# Patient Record
Sex: MALE | Race: WHITE | NOT HISPANIC OR LATINO | Employment: FULL TIME | URBAN - METROPOLITAN AREA
[De-identification: names, ages, dates, MRNs, and addresses within clinical notes are randomized per-mention and may not be internally consistent; named-entity substitution may affect disease eponyms.]

---

## 2019-08-26 ENCOUNTER — OFFICE VISIT (OUTPATIENT)
Dept: FAMILY MEDICINE CLINIC | Facility: CLINIC | Age: 59
End: 2019-08-26

## 2019-08-26 VITALS
OXYGEN SATURATION: 96 % | WEIGHT: 315 LBS | HEART RATE: 65 BPM | DIASTOLIC BLOOD PRESSURE: 78 MMHG | HEIGHT: 72 IN | BODY MASS INDEX: 42.66 KG/M2 | TEMPERATURE: 97.9 F | SYSTOLIC BLOOD PRESSURE: 122 MMHG

## 2019-08-26 DIAGNOSIS — M19.90 ARTHRITIS: ICD-10-CM

## 2019-08-26 DIAGNOSIS — E11.42 DIABETIC POLYNEUROPATHY ASSOCIATED WITH TYPE 2 DIABETES MELLITUS (HCC): ICD-10-CM

## 2019-08-26 DIAGNOSIS — E11.40 TYPE 2 DIABETES MELLITUS WITH DIABETIC NEUROPATHY, WITHOUT LONG-TERM CURRENT USE OF INSULIN (HCC): ICD-10-CM

## 2019-08-26 DIAGNOSIS — I10 ESSENTIAL HYPERTENSION: Primary | ICD-10-CM

## 2019-08-26 PROBLEM — M54.6 THORACIC BACK PAIN: Status: ACTIVE | Noted: 2019-08-26

## 2019-08-26 PROBLEM — E29.1 HYPOGONADISM MALE: Status: ACTIVE | Noted: 2019-08-26

## 2019-08-26 PROBLEM — E11.9 TYPE 2 DIABETES MELLITUS (HCC): Status: ACTIVE | Noted: 2019-08-26

## 2019-08-26 PROBLEM — M25.561 RIGHT KNEE PAIN: Status: ACTIVE | Noted: 2019-08-26

## 2019-08-26 PROBLEM — N52.9 ERECTILE DYSFUNCTION: Status: ACTIVE | Noted: 2019-08-26

## 2019-08-26 PROBLEM — G62.9 PERIPHERAL NEUROPATHY: Status: ACTIVE | Noted: 2019-08-26

## 2019-08-26 PROBLEM — J30.9 ALLERGIC RHINITIS: Status: ACTIVE | Noted: 2019-08-26

## 2019-08-26 PROBLEM — K60.3 ANAL FISTULA: Status: ACTIVE | Noted: 2019-08-26

## 2019-08-26 PROBLEM — M51.34 DEGENERATION, INTERVERTEBRAL DISC, THORACIC: Status: ACTIVE | Noted: 2019-08-26

## 2019-08-26 PROBLEM — N40.0 BPH (BENIGN PROSTATIC HYPERPLASIA): Status: ACTIVE | Noted: 2019-08-26

## 2019-08-26 PROCEDURE — 99213 OFFICE O/P EST LOW 20 MIN: CPT | Performed by: INTERNAL MEDICINE

## 2019-08-26 RX ORDER — PREGABALIN 100 MG/1
CAPSULE ORAL
Refills: 2 | COMMUNITY
Start: 2019-08-12 | End: 2019-10-14 | Stop reason: SDUPTHER

## 2019-08-26 RX ORDER — METOPROLOL SUCCINATE 100 MG/1
100 TABLET, EXTENDED RELEASE ORAL DAILY
Refills: 1 | COMMUNITY
Start: 2019-08-22 | End: 2020-02-20 | Stop reason: SDUPTHER

## 2019-08-26 RX ORDER — DICLOFENAC SODIUM 75 MG/1
TABLET, DELAYED RELEASE ORAL
Refills: 0 | COMMUNITY
Start: 2019-08-22 | End: 2019-08-26 | Stop reason: SDUPTHER

## 2019-08-26 RX ORDER — DICLOFENAC SODIUM 75 MG/1
75 TABLET, DELAYED RELEASE ORAL 2 TIMES DAILY
Qty: 180 TABLET | Refills: 1 | Status: SHIPPED | OUTPATIENT
Start: 2019-08-26 | End: 2019-12-05 | Stop reason: SDUPTHER

## 2019-08-26 RX ORDER — DULOXETIN HYDROCHLORIDE 60 MG/1
CAPSULE, DELAYED RELEASE ORAL
Refills: 5 | COMMUNITY
Start: 2019-08-05

## 2019-08-26 RX ORDER — LISINOPRIL AND HYDROCHLOROTHIAZIDE 12.5; 1 MG/1; MG/1
TABLET ORAL
COMMUNITY
Start: 2018-06-26 | End: 2019-08-26 | Stop reason: SDUPTHER

## 2019-08-26 RX ORDER — LISINOPRIL AND HYDROCHLOROTHIAZIDE 12.5; 1 MG/1; MG/1
1 TABLET ORAL DAILY
Qty: 90 TABLET | Refills: 1 | Status: SHIPPED | OUTPATIENT
Start: 2019-08-26 | End: 2020-06-15 | Stop reason: SDUPTHER

## 2019-08-26 NOTE — PROGRESS NOTES
Subjective   Eduardo Ramires is a 59 y.o. male.     Chief Complaint   Patient presents with   • Hypertension   • Arthritis   • Med Refill       History of Present Illness   Follow-up for hypertension.  Currently has been feeling well and asymptomatic without any headaches,vision changes, cough, chest pain, shortness of breath, swelling, focal neurologic deficit, memory loss or syncope.  Has been taking the medications regularly and adherent with the regimen, Denies medication side effects and no significant interval events.   Recently was seen by hem/onc for possible Multiple Myeloma and was negative with normal labs.    The following portions of the patient's history were reviewed and updated as appropriate: allergies, current medications, past family history, past medical history, past social history, past surgical history and problem list.    Past Medical History:   Diagnosis Date   • Allergic rhinitis 8/26/2019   • Anal fistula 8/26/2019   • Arthritis 8/26/2019   • BPH (benign prostatic hyperplasia) 8/26/2019   • Degeneration, intervertebral disc, thoracic 8/26/2019   • Degenerative joint disease 8/26/2019   • Diabetic polyneuropathy (CMS/HCC) 8/26/2019   • Erectile dysfunction 8/26/2019   • Hypertension 8/26/2019   • Hypogonadism male 8/26/2019   • Nephrolithiasis    • Peripheral neuropathy 8/26/2019   • Right knee pain 8/26/2019   • Thoracic back pain 8/26/2019   • Type 2 diabetes mellitus (CMS/HCC) 8/26/2019       Past Surgical History:   Procedure Laterality Date   • COLONOSCOPY  2010    normal   • KIDNEY STONE SURGERY  2016    laser stone surgery   • LUMBAR SPINE SURGERY  1989   • SHOULDER ROTATOR CUFF REPAIR Left 2012   • TOTAL HIP ARTHROPLASTY Left 12/10/2018    Dr Barraza       Family History   Problem Relation Age of Onset   • Valvular heart disease Mother    • Arthritis Father    • Hypertension Father    • Diabetes type II Father        Social History     Socioeconomic History   • Marital status:       Spouse name: Not on file   • Number of children: Not on file   • Years of education: Not on file   • Highest education level: Not on file   Tobacco Use   • Smoking status: Former Smoker   • Smokeless tobacco: Never Used   Substance and Sexual Activity   • Alcohol use: Yes     Comment: occasionally   • Drug use: Yes     Types: Marijuana       Review of Systems    Objective   Vitals:    08/26/19 0959   BP: 122/78   Pulse: 65   Temp: 97.9 °F (36.6 °C)   SpO2: 96%     Body mass index is 44.86 kg/m².  Physical Exam    Recent Results (from the past 2016 hour(s))   VITAMIN B12    Collection Time: 06/25/19  9:17 AM   Result Value Ref Range    Vitamin B-12 340 213 - 816 pg/mL   IMMUNOFIXATION SERUM    Collection Time: 06/25/19  9:17 AM   Result Value Ref Range    Interpretation       See Results and Interpretation under Pathology Tab.    IgG 1,206 694 - 1,618 mg/dL    IgA 318 68 - 378 mg/dL    IgM 58 (L) 60 - 263 mg/dL   PROTEIN ELECTROPHORESIS, TOTAL    Collection Time: 06/25/19  9:17 AM   Result Value Ref Range    Comment       See Results and Interpretation under Pathology Tab.   COMPREHENSIVE METABOLIC PANEL    Collection Time: 07/10/19 11:52 AM   Result Value Ref Range    Sodium 139 137 - 145 mmol/L    Potassium 4.5 3.5 - 5.1 mmol/L    Chloride 98 98 - 107 mmol/L    Total CO2 30 22 - 30 mmol/L    Glucose 148 mg/dL    BUN 18 7 - 20 mg/dL    Creatinine 0.9 0.7 - 1.5 mg/dL    BUN/Creatinine Ratio 20.0 RATIO    Total Protein 8.3 (H) 6.3 - 8.2 g/dL    Albumin 4.3 3.5 - 5.0 g/dL    Globulin 4.0 1.5 - 4.5 g/dL    A/G Ratio 1.1 1.1 - 2.5 RATIO    Calcium 9.2 8.4 - 10.2 mg/dL    Total Bilirubin 0.6 0.2 - 1.3 mg/dL    AST (SGOT) 24 15 - 46 U/L    ALT (SGPT) 30 13 - 69 U/L    Alkaline Phosphatase 81 38 - 126 U/L    Est GFR by Clearance >60 >60 /1.73 m2   HEMOGLOBIN A1C    Collection Time: 07/10/19 11:52 AM   Result Value Ref Range    Hemoglobin A1C 6.7 (H) 4.3 - 5.6 %    Mean Bld Glu Estim. 146 mg/dL   PROTEIN  ELECTROPHORESIS, TOTAL    Collection Time: 07/10/19 11:52 AM   Result Value Ref Range    Comment       See Results and Interpretation under Pathology Tab.   IMMUNOFIXATION SERUM    Collection Time: 07/10/19 11:52 AM   Result Value Ref Range    Interpretation       See Results and Interpretation under Pathology Tab.    IgG 1,232 694 - 1,618 mg/dL    IgA 331 68 - 378 mg/dL    IgM 50 (L) 60 - 263 mg/dL   CBC AND DIFFERENTIAL    Collection Time: 07/10/19 11:52 AM   Result Value Ref Range    WBC 11.10 (H) 4.5 - 11.0 10*3/uL    RBC 5.73 4.5 - 5.9 10*6/uL    Hemoglobin 16.5 13.5 - 17.5 g/dL    Hematocrit 48.6 41.0 - 53.0 %    MCV 84.8 80.0 - 100.0 fL    MCH 28.8 26.0 - 34.0 pg    MCHC 34.0 31.0 - 37.0 g/dL    RDW 14.3 12.0 - 16.8 %    Platelets 285 140 - 440 10*3/uL    MPV 10.1 6.7 - 10.8 fL    Neutrophil Rel % 62.9 45 - 80 %    Lymphocyte Rel % 25.6 15 - 50 %    Monocyte Rel % 6.2 0 - 15 %    Eosinophil % 4.9 0 - 7 %    Basophil Rel % 0.4 0 - 2 %    Neutrophils Absolute 6.99 2.0 - 8.8 10*3/uL    Lymphocytes Absolute 2.84 0.7 - 5.5 10*3/uL    Monocytes Absolute 0.69 0.0 - 1.7 10*3/uL    Eosinophils Absolute 0.54 0.0 - 0.8 10*3/uL    Basophils Absolute 0.04 0.0 - 0.2 10*3/uL   IMMUNOGLOBULIN FREE LT CHAINS BLOOD    Collection Time: 07/10/19 11:52 AM   Result Value Ref Range    Kappa FLC 21.24 (H) 3.30 - 19.40 mg/L    Urine Free Lambda Excretion/Day 19.73 5.71 - 26.30 mg/L    Kappa/Lambda FluidC Ratio 1.08 0.26 - 1.65 mg/L     Assessment/Plan   Eduardo was seen today for hypertension, arthritis and med refill.    Diagnoses and all orders for this visit:    Essential hypertension  -     lisinopril-hydrochlorothiazide (PRINZIDE,ZESTORETIC) 10-12.5 MG per tablet; Take 1 tablet by mouth Daily.    Arthritis  -     diclofenac (VOLTAREN) 75 MG EC tablet; Take 1 tablet by mouth 2 (Two) Times a Day.    Type 2 diabetes mellitus with diabetic neuropathy, without long-term current use of insulin (CMS/[facility])    Diabetic polyneuropathy  associated with type 2 diabetes mellitus (CMS/HCC)    BMI 40.0-44.9, adult (CMS/HCC)    Labs reviewed from June 2019.  Continue the current medications and no change.  Encouraged diet and exercise for weight loss and diabetes treatment.

## 2019-08-26 NOTE — PATIENT INSTRUCTIONS
Diabetes Mellitus and Foot Care  Foot care is an important part of your health, especially when you have diabetes. Diabetes may cause you to have problems because of poor blood flow (circulation) to your feet and legs, which can cause your skin to:  · Become thinner and drier.  · Break more easily.  · Heal more slowly.  · Peel and crack.  You may also have nerve damage (neuropathy) in your legs and feet, causing decreased feeling in them. This means that you may not notice minor injuries to your feet that could lead to more serious problems. Noticing and addressing any potential problems early is the best way to prevent future foot problems.  How to care for your feet  Foot hygiene  · Wash your feet daily with warm water and mild soap. Do not use hot water. Then, pat your feet and the areas between your toes until they are completely dry. Do not soak your feet as this can dry your skin.  · Trim your toenails straight across. Do not dig under them or around the cuticle. File the edges of your nails with an emery board or nail file.  · Apply a moisturizing lotion or petroleum jelly to the skin on your feet and to dry, brittle toenails. Use lotion that does not contain alcohol and is unscented. Do not apply lotion between your toes.  Shoes and socks  · Wear clean socks or stockings every day. Make sure they are not too tight. Do not wear knee-high stockings since they may decrease blood flow to your legs.  · Wear shoes that fit properly and have enough cushioning. Always look in your shoes before you put them on to be sure there are no objects inside.  · To break in new shoes, wear them for just a few hours a day. This prevents injuries on your feet.  Wounds, scrapes, corns, and calluses  · Check your feet daily for blisters, cuts, bruises, sores, and redness. If you cannot see the bottom of your feet, use a mirror or ask someone for help.  · Do not cut corns or calluses or try to remove them with medicine.  · If you  find a minor scrape, cut, or break in the skin on your feet, keep it and the skin around it clean and dry. You may clean these areas with mild soap and water. Do not clean the area with peroxide, alcohol, or iodine.  · If you have a wound, scrape, corn, or callus on your foot, look at it several times a day to make sure it is healing and not infected. Check for:  ? Redness, swelling, or pain.  ? Fluid or blood.  ? Warmth.  ? Pus or a bad smell.  General instructions  · Do not cross your legs. This may decrease blood flow to your feet.  · Do not use heating pads or hot water bottles on your feet. They may burn your skin. If you have lost feeling in your feet or legs, you may not know this is happening until it is too late.  · Protect your feet from hot and cold by wearing shoes, such as at the beach or on hot pavement.  · Schedule a complete foot exam at least once a year (annually) or more often if you have foot problems. If you have foot problems, report any cuts, sores, or bruises to your health care provider immediately.  Contact a health care provider if:  · You have a medical condition that increases your risk of infection and you have any cuts, sores, or bruises on your feet.  · You have an injury that is not healing.  · You have redness on your legs or feet.  · You feel burning or tingling in your legs or feet.  · You have pain or cramps in your legs and feet.  · Your legs or feet are numb.  · Your feet always feel cold.  · You have pain around a toenail.  Get help right away if:  · You have a wound, scrape, corn, or callus on your foot and:  ? You have pain, swelling, or redness that gets worse.  ? You have fluid or blood coming from the wound, scrape, corn, or callus.  ? Your wound, scrape, corn, or callus feels warm to the touch.  ? You have pus or a bad smell coming from the wound, scrape, corn, or callus.  ? You have a fever.  ? You have a red line going up your leg.  Summary  · Check your feet every day  for cuts, sores, red spots, swelling, and blisters.  · Moisturize feet and legs daily.  · Wear shoes that fit properly and have enough cushioning.  · If you have foot problems, report any cuts, sores, or bruises to your health care provider immediately.  · Schedule a complete foot exam at least once a year (annually) or more often if you have foot problems.  This information is not intended to replace advice given to you by your health care provider. Make sure you discuss any questions you have with your health care provider.  Document Released: 12/15/2001 Document Revised: 01/30/2019 Document Reviewed: 01/19/2018  [employer] Interactive Patient Education © 2019 [employer]  Diabetes Mellitus and Nutrition, Adult  When you have diabetes (diabetes mellitus), it is very important to have healthy eating habits because your blood sugar (glucose) levels are greatly affected by what you eat and drink. Eating healthy foods in the appropriate amounts, at about the same times every day, can help you:  · Control your blood glucose.  · Lower your risk of heart disease.  · Improve your blood pressure.  · Reach or maintain a healthy weight.  Every person with diabetes is different, and each person has different needs for a meal plan. Your health care provider may recommend that you work with a diet and nutrition specialist (dietitian) to make a meal plan that is best for you. Your meal plan may vary depending on factors such as:  · The calories you need.  · The medicines you take.  · Your weight.  · Your blood glucose, blood pressure, and cholesterol levels.  · Your activity level.  · Other health conditions you have, such as heart or kidney disease.  How do carbohydrates affect me?  Carbohydrates, also called carbs, affect your blood glucose level more than any other type of food. Eating carbs naturally raises the amount of glucose in your blood. Carb counting is a method for keeping track of how many carbs you eat. Counting  "carbs is important to keep your blood glucose at a healthy level, especially if you use insulin or take certain oral diabetes medicines.  It is important to know how many carbs you can safely have in each meal. This is different for every person. Your dietitian can help you calculate how many carbs you should have at each meal and for each snack.  Foods that contain carbs include:  · Bread, cereal, rice, pasta, and crackers.  · Potatoes and corn.  · Peas, beans, and lentils.  · Milk and yogurt.  · Fruit and juice.  · Desserts, such as cakes, cookies, ice cream, and candy.  How does alcohol affect me?  Alcohol can cause a sudden decrease in blood glucose (hypoglycemia), especially if you use insulin or take certain oral diabetes medicines. Hypoglycemia can be a life-threatening condition. Symptoms of hypoglycemia (sleepiness, dizziness, and confusion) are similar to symptoms of having too much alcohol.  If your health care provider says that alcohol is safe for you, follow these guidelines:  · Limit alcohol intake to no more than 1 drink per day for nonpregnant women and 2 drinks per day for men. One drink equals 12 oz of beer, 5 oz of wine, or 1½ oz of hard liquor.  · Do not drink on an empty stomach.  · Keep yourself hydrated with water, diet soda, or unsweetened iced tea.  · Keep in mind that regular soda, juice, and other mixers may contain a lot of sugar and must be counted as carbs.  What are tips for following this plan?    Reading food labels  · Start by checking the serving size on the \"Nutrition Facts\" label of packaged foods and drinks. The amount of calories, carbs, fats, and other nutrients listed on the label is based on one serving of the item. Many items contain more than one serving per package.  · Check the total grams (g) of carbs in one serving. You can calculate the number of servings of carbs in one serving by dividing the total carbs by 15. For example, if a food has 30 g of total carbs, it " "would be equal to 2 servings of carbs.  · Check the number of grams (g) of saturated and trans fats in one serving. Choose foods that have low or no amount of these fats.  · Check the number of milligrams (mg) of salt (sodium) in one serving. Most people should limit total sodium intake to less than 2,300 mg per day.  · Always check the nutrition information of foods labeled as \"low-fat\" or \"nonfat\". These foods may be higher in added sugar or refined carbs and should be avoided.  · Talk to your dietitian to identify your daily goals for nutrients listed on the label.  Shopping  · Avoid buying canned, premade, or processed foods. These foods tend to be high in fat, sodium, and added sugar.  · Shop around the outside edge of the grocery store. This includes fresh fruits and vegetables, bulk grains, fresh meats, and fresh dairy.  Cooking  · Use low-heat cooking methods, such as baking, instead of high-heat cooking methods like deep frying.  · Cook using healthy oils, such as olive, canola, or sunflower oil.  · Avoid cooking with butter, cream, or high-fat meats.  Meal planning  · Eat meals and snacks regularly, preferably at the same times every day. Avoid going long periods of time without eating.  · Eat foods high in fiber, such as fresh fruits, vegetables, beans, and whole grains. Talk to your dietitian about how many servings of carbs you can eat at each meal.  · Eat 4-6 ounces (oz) of lean protein each day, such as lean meat, chicken, fish, eggs, or tofu. One oz of lean protein is equal to:  ? 1 oz of meat, chicken, or fish.  ? 1 egg.  ? ¼ cup of tofu.  · Eat some foods each day that contain healthy fats, such as avocado, nuts, seeds, and fish.  Lifestyle  · Check your blood glucose regularly.  · Exercise regularly as told by your health care provider. This may include:  ? 150 minutes of moderate-intensity or vigorous-intensity exercise each week. This could be brisk walking, biking, or water " aerobics.  ? Stretching and doing strength exercises, such as yoga or weightlifting, at least 2 times a week.  · Take medicines as told by your health care provider.  · Do not use any products that contain nicotine or tobacco, such as cigarettes and e-cigarettes. If you need help quitting, ask your health care provider.  · Work with a counselor or diabetes educator to identify strategies to manage stress and any emotional and social challenges.  Questions to ask a health care provider  · Do I need to meet with a diabetes educator?  · Do I need to meet with a dietitian?  · What number can I call if I have questions?  · When are the best times to check my blood glucose?  Where to find more information:  · American Diabetes Association: diabetes.org  · Academy of Nutrition and Dietetics: www.eatright.org  · National [place] of Diabetes and Digestive and Kidney Diseases (NIH): www.niddk.nih.gov  Summary  · A healthy meal plan will help you control your blood glucose and maintain a healthy lifestyle.  · Working with a diet and nutrition specialist (dietitian) can help you make a meal plan that is best for you.  · Keep in mind that carbohydrates (carbs) and alcohol have immediate effects on your blood glucose levels. It is important to count carbs and to use alcohol carefully.  This information is not intended to replace advice given to you by your health care provider. Make sure you discuss any questions you have with your health care provider.  Document Released: 09/14/2006 Document Revised: 07/18/2018 Document Reviewed: 01/22/2018  [employer] Patient Education © 2019 [employer]

## 2019-10-14 RX ORDER — PREGABALIN 100 MG/1
CAPSULE ORAL
Qty: 90 CAPSULE | Refills: 2 | Status: SHIPPED | OUTPATIENT
Start: 2019-10-14

## 2019-12-01 ENCOUNTER — HOSPITAL ENCOUNTER (EMERGENCY)
Facility: HOSPITAL | Age: 59
Discharge: HOME | End: 2019-12-01
Attending: EMERGENCY MEDICINE | Admitting: EMERGENCY MEDICINE

## 2019-12-01 ENCOUNTER — APPOINTMENT (OUTPATIENT)
Dept: GENERAL RADIOLOGY | Facility: HOSPITAL | Age: 59
End: 2019-12-01

## 2019-12-01 VITALS
HEART RATE: 107 BPM | WEIGHT: 315 LBS | SYSTOLIC BLOOD PRESSURE: 149 MMHG | OXYGEN SATURATION: 96 % | HEIGHT: 71 IN | RESPIRATION RATE: 14 BRPM | DIASTOLIC BLOOD PRESSURE: 90 MMHG | TEMPERATURE: 98.1 F | BODY MASS INDEX: 44.1 KG/M2

## 2019-12-01 DIAGNOSIS — S80.02XA CONTUSION OF LEFT KNEE, INITIAL ENCOUNTER: ICD-10-CM

## 2019-12-01 DIAGNOSIS — M25.511 ACUTE PAIN OF RIGHT SHOULDER: Primary | ICD-10-CM

## 2019-12-01 PROCEDURE — 25010000002 KETOROLAC TROMETHAMINE PER 15 MG: Performed by: EMERGENCY MEDICINE

## 2019-12-01 PROCEDURE — 73030 X-RAY EXAM OF SHOULDER: CPT

## 2019-12-01 PROCEDURE — 96372 THER/PROPH/DIAG INJ SC/IM: CPT

## 2019-12-01 PROCEDURE — 99283 EMERGENCY DEPT VISIT LOW MDM: CPT

## 2019-12-01 RX ORDER — KETOROLAC TROMETHAMINE 30 MG/ML
30 INJECTION, SOLUTION INTRAMUSCULAR; INTRAVENOUS ONCE
Status: COMPLETED | OUTPATIENT
Start: 2019-12-01 | End: 2019-12-01

## 2019-12-01 RX ADMIN — KETOROLAC TROMETHAMINE 30 MG: 30 INJECTION, SOLUTION INTRAMUSCULAR at 17:55

## 2019-12-01 NOTE — ED TRIAGE NOTES
"Pt tripped on sidewalk walking into work. Pt c/o right shoulder and left knee pain. Pt \"face planted in the grass.\" Denies LOC, no blood thinners.  "

## 2019-12-01 NOTE — DISCHARGE INSTRUCTIONS
Warm heating pads, light stretching and range of motion exercises, sling for comfort for the next 1 to 2 days, Tylenol and ibuprofen for pain, follow-up with your primary care provider and orthopedic for recheck if no improvement in 1 week.  Return to the emergency department for worsening symptoms as needed.

## 2019-12-01 NOTE — ED PROVIDER NOTES
EMERGENCY DEPARTMENT ENCOUNTER    Room Number:  07/07  Date of encounter:  12/1/2019  PCP: Stan Calderon MD  Historian: Patient      HPI:  Chief Complaint: Right shoulder pain  A complete HPI/ROS/PMH/PSH/SH/FH are unobtainable due to: None    Context: Eduardo Ramires is a 59 y.o. male who presents to the ED c/o right shoulder pain and left knee pain after tripping and falling earlier this afternoon.  Patient states that he landed on his left knee and then caught himself with his right arm.  Has been ambulatory on the leg states that his knee pain has improved does have a bruise there he states.  Major complaint at this point is right shoulder pain with decreased range of motion due to pain.  Denies head injury or neck pain.  States that he did hit his face on the grass, denies pain or difficulty opening or closing the mouth.  Denies any nosebleed or swelling of the face.  Denies loss of consciousness.      PAST MEDICAL HISTORY  Active Ambulatory Problems     Diagnosis Date Noted   • Allergic rhinitis 08/26/2019   • Anal fistula 08/26/2019   • Arthritis 08/26/2019   • BPH (benign prostatic hyperplasia) 08/26/2019   • Degeneration, intervertebral disc, thoracic 08/26/2019   • Degenerative joint disease 08/26/2019   • Diabetic polyneuropathy (CMS/[facility]) 08/26/2019   • Hypertension 08/26/2019   • Erectile dysfunction 08/26/2019   • Hypogonadism male 08/26/2019   • Type 2 diabetes mellitus (CMS/[facility]) 08/26/2019   • Peripheral neuropathy 08/26/2019   • Right knee pain 08/26/2019   • Thoracic back pain 08/26/2019   • BMI 40.0-44.9, adult (CMS/[facility]) 08/26/2019     Resolved Ambulatory Problems     Diagnosis Date Noted   • No Resolved Ambulatory Problems     Past Medical History:   Diagnosis Date   • Allergic rhinitis 8/26/2019   • Anal fistula 8/26/2019   • Arthritis 8/26/2019   • BPH (benign prostatic hyperplasia) 8/26/2019   • Degeneration, intervertebral disc, thoracic 8/26/2019   • Degenerative joint disease 8/26/2019    • Diabetic polyneuropathy (CMS/[facility]) 8/26/2019   • Erectile dysfunction 8/26/2019   • Hypertension 8/26/2019   • Hypogonadism male 8/26/2019   • Nephrolithiasis    • Peripheral neuropathy 8/26/2019   • Right knee pain 8/26/2019   • Thoracic back pain 8/26/2019   • Type 2 diabetes mellitus (CMS/[facility]) 8/26/2019         PAST SURGICAL HISTORY  Past Surgical History:   Procedure Laterality Date   • COLONOSCOPY  2010    normal   • KIDNEY STONE SURGERY  2016    laser stone surgery   • LUMBAR SPINE SURGERY  1989   • SHOULDER ROTATOR CUFF REPAIR Left 2012   • TOTAL HIP ARTHROPLASTY Left 12/10/2018    Dr Barraza         FAMILY HISTORY  Family History   Problem Relation Age of Onset   • Valvular heart disease Mother    • Arthritis Father    • Hypertension Father    • Diabetes type II Father          SOCIAL HISTORY  Social History     Socioeconomic History   • Marital status:      Spouse name: Not on file   • Number of children: Not on file   • Years of education: Not on file   • Highest education level: Not on file   Tobacco Use   • Smoking status: Former Smoker   • Smokeless tobacco: Never Used   Substance and Sexual Activity   • Alcohol use: Yes     Comment: occasionally   • Drug use: Yes     Types: Marijuana         ALLERGIES  Patient has no known allergies.        REVIEW OF SYSTEMS  Review of Systems     All systems reviewed and negative except for those discussed in HPI.       PHYSICAL EXAM    I have reviewed the triage vital signs and nursing notes.    ED Triage Vitals   Temp Heart Rate Resp BP SpO2   12/01/19 1521 12/01/19 1521 12/01/19 1627 12/01/19 1627 12/01/19 1521   98.1 °F (36.7 °C) 107 14 149/90 96 %      Temp src Heart Rate Source Patient Position BP Location FiO2 (%)   12/01/19 1521 12/01/19 1521 12/01/19 1627 12/01/19 1627 --   Tympanic Monitor Sitting Left arm        Physical Exam  General: Awake, alert, no acute distress  HEENT: EOMI, no facial hematoma, laceration, edema.  No epistaxis.  Normal range  of motion of the jaw with no tenderness to the face.  Neck: Full range of motion, nontender  Pulm: Symmetric chest rise, nonlabored breathing  CV: Regular rate and rhythm  GI: Non-distended  MSK: No deformity, reproducible pain in the right shoulder with movement.  Minimal reducible pain to palpation, no palpable deformity in the clavicle or shoulder joint.  Normal range of motion of the right elbow and wrist without pain.    Skin: Warm, dry  Neuro: Alert and oriented x 3, moving all extremities, strength and sensation is 5 out of 5 distally in the right upper extremity with decreased strength proximally due to pain, no focal deficits  Psych: Calm, cooperative    Vital signs and nursing notes reviewed.           LAB RESULTS  No results found for this or any previous visit (from the past 24 hour(s)).          RADIOLOGY  Xr Shoulder 2+ View Right    Result Date: 12/1/2019  XR SHOULDER 2+ VW RIGHT-  12/01/2019  HISTORY: Fell, right shoulder pain.  There is mild right AC joint arthropathy.  No fractures or dislocations are seen.         I ordered the above noted radiological studies. Reviewed by me.  See dictation for official radiology interpretation.      PROCEDURES    Procedures      MEDICATIONS GIVEN IN ER    Medications   ketorolac (TORADOL) injection 30 mg (30 mg Intramuscular Given 12/1/19 1755)         PROGRESS, DATA ANALYSIS, CONSULTS, AND MEDICAL DECISION MAKING    All labs have been independently reviewed by me.  All radiology studies have been reviewed by me and discussed with radiologist dictating the report.   EKG's independently viewed and interpreted by me.  Discussion below represents my analysis of pertinent findings related to patient's condition, differential diagnosis, treatment plan and final disposition.      ED Course as of Dec 01 1927   Sun Dec 01, 2019   1748 Patient updated on the negative x-ray findings without evidence of fracture or dislocation.  Plan for IM Toradol for pain control prior  to discharge, sling for comfort though I have heavily counseled him on the need for heat and increased range of motion exercises and to limit use of sling.  He states he already has an orthopedic he will follow-up with if needed.  [DC]      ED Course User Index  [DC] Ervin Owusu MD       AS OF 7:27 PM VITALS:    BP - 149/90  HR - 107  TEMP - 98.1 °F (36.7 °C) (Tympanic)  02 SATS - 96%        DIAGNOSIS  Final diagnoses:   Acute pain of right shoulder   Contusion of left knee, initial encounter         DISPOSITION  DISCHARGE    Patient discharged in stable condition.    Reviewed implications of results, diagnosis, meds, responsibility to follow up, warning signs and symptoms of possible worsening, potential complications and reasons to return to ER.    Patient/Family voiced understanding of above instructions.    Discussed plan for discharge, as there is no emergent indication for admission. Patient referred to primary care provider for BP management due to today's BP. Pt/family is agreeable and understands need for follow up and repeat testing.  Pt is aware that discharge does not mean that nothing is wrong but it indicates no emergency is present that requires admission and they must continue care with follow-up as given below or physician of their choice.     FOLLOW-UP  [facility] Emergency Department  [address]  [facility] [id number] [phone]    As needed, If symptoms worsen    Stan Calderon MD  [address]  [address]  [facility] [address]  [phone]    Schedule an appointment as soon as possible for a visit   As needed    Trent Krishnamurthy II, MD  [address]  [address]  [facility] [address]  [phone]    Schedule an appointment as soon as possible for a visit   As needed         Medication List      No changes were made to your prescriptions during this visit.                  Ervin Owusu MD  12/01/19 9585

## 2019-12-05 ENCOUNTER — OFFICE VISIT (OUTPATIENT)
Dept: FAMILY MEDICINE CLINIC | Facility: CLINIC | Age: 59
End: 2019-12-05

## 2019-12-05 VITALS
BODY MASS INDEX: 42.66 KG/M2 | SYSTOLIC BLOOD PRESSURE: 132 MMHG | DIASTOLIC BLOOD PRESSURE: 86 MMHG | OXYGEN SATURATION: 98 % | TEMPERATURE: 97.8 F | HEART RATE: 76 BPM | WEIGHT: 315 LBS | HEIGHT: 72 IN

## 2019-12-05 DIAGNOSIS — Z12.11 COLON CANCER SCREENING: ICD-10-CM

## 2019-12-05 DIAGNOSIS — J30.0 VASOMOTOR RHINITIS: Primary | ICD-10-CM

## 2019-12-05 DIAGNOSIS — Z23 NEED FOR IMMUNIZATION AGAINST INFLUENZA: ICD-10-CM

## 2019-12-05 DIAGNOSIS — E66.01 CLASS 3 SEVERE OBESITY WITHOUT SERIOUS COMORBIDITY WITH BODY MASS INDEX (BMI) OF 45.0 TO 49.9 IN ADULT, UNSPECIFIED OBESITY TYPE (HCC): ICD-10-CM

## 2019-12-05 DIAGNOSIS — M19.90 ARTHRITIS: ICD-10-CM

## 2019-12-05 DIAGNOSIS — H65.02 ACUTE SEROUS OTITIS MEDIA OF LEFT EAR WITHOUT RUPTURE: ICD-10-CM

## 2019-12-05 DIAGNOSIS — E29.1 HYPOGONADISM MALE: ICD-10-CM

## 2019-12-05 PROBLEM — J31.0 RHINITIS: Status: ACTIVE | Noted: 2019-12-05

## 2019-12-05 PROBLEM — H65.00: Status: ACTIVE | Noted: 2019-12-05

## 2019-12-05 PROCEDURE — 90471 IMMUNIZATION ADMIN: CPT | Performed by: INTERNAL MEDICINE

## 2019-12-05 PROCEDURE — 90686 IIV4 VACC NO PRSV 0.5 ML IM: CPT | Performed by: INTERNAL MEDICINE

## 2019-12-05 PROCEDURE — 99214 OFFICE O/P EST MOD 30 MIN: CPT | Performed by: INTERNAL MEDICINE

## 2019-12-05 RX ORDER — DICLOFENAC SODIUM 75 MG/1
75 TABLET, DELAYED RELEASE ORAL 2 TIMES DAILY
Qty: 180 TABLET | Refills: 1 | Status: SHIPPED | OUTPATIENT
Start: 2019-12-05 | End: 2020-06-15 | Stop reason: SDUPTHER

## 2019-12-05 RX ORDER — TESTOSTERONE CYPIONATE 200 MG/ML
200 INJECTION, SOLUTION INTRAMUSCULAR
Qty: 1 ML | Refills: 3 | Status: SHIPPED | OUTPATIENT
Start: 2019-12-05 | End: 2020-02-20 | Stop reason: SDUPTHER

## 2019-12-05 RX ORDER — FLUTICASONE PROPIONATE 50 MCG
1 SPRAY, SUSPENSION (ML) NASAL
COMMUNITY

## 2019-12-05 RX ORDER — IPRATROPIUM BROMIDE 42 UG/1
2 SPRAY, METERED NASAL 4 TIMES DAILY
Qty: 1 EACH | Refills: 12 | Status: SHIPPED | OUTPATIENT
Start: 2019-12-05

## 2019-12-05 NOTE — PROGRESS NOTES
Subjective   Eduardo Ramires is a 59 y.o. male.     Chief Complaint   Patient presents with   • Ear Problem   • Sinus Problem       History of Present Illness   States when bends forward feels like something moving in the left ear.  No pain or drainage.  Has popping in both ears.  Does have some sinus congestion with no headache but clear nasal drainage.  The runny nose is worse when eating.  No fever, chills, nausea or vomiting.  Patient is wanting to start back on the testosterone and has not had the testosterone injection in the last year.  Having decreased libido and ED issues,  Mild fatigue.  Testosterone level in May was 91 on no medications.  The following portions of the patient's history were reviewed and updated as appropriate: allergies, current medications, past family history, past medical history, past social history, past surgical history and problem list.    Past Medical History:   Diagnosis Date   • Allergic rhinitis 8/26/2019   • Anal fistula 8/26/2019   • Arthritis 8/26/2019   • BMI 45.0-49.9, adult (CMS/[facility])    • BPH (benign prostatic hyperplasia) 8/26/2019   • Degeneration, intervertebral disc, thoracic 8/26/2019   • Degenerative joint disease 8/26/2019   • Degenerative joint disease of left hip    • Diabetic polyneuropathy (CMS/[facility]) 8/26/2019   • Elevated blood sugar    • Erectile dysfunction 8/26/2019   • High blood pressure    • History of kidney stones    • Hypertension 8/26/2019   • Hypogonadism male 8/26/2019   • Left hip pain    • Nephrolithiasis    • Peripheral neuropathy 8/26/2019   • Preoperative clearance    • Refused influenza vaccine    • Right knee pain 8/26/2019   • Synovial cyst popliteal space    • Thoracic back pain 8/26/2019   • Type 2 diabetes mellitus (CMS/[facility]) 8/26/2019       Past Surgical History:   Procedure Laterality Date   • BACK SURGERY      Lower Back Surgery   • COLONOSCOPY  2010    normal   • KIDNEY STONE SURGERY  2016    laser stone surgery   • LUMBAR SPINE  SURGERY  1989   • SHOULDER ROTATOR CUFF REPAIR Left 2012   • TOTAL HIP ARTHROPLASTY Left 12/10/2018    Dr Barraza       Family History   Problem Relation Age of Onset   • Valvular heart disease Mother    • Arthritis Father    • Hypertension Father    • Diabetes type II Father    • No Known Problems Other        Social History     Socioeconomic History   • Marital status:      Spouse name: Not on file   • Number of children: Not on file   • Years of education: Not on file   • Highest education level: Not on file   Tobacco Use   • Smoking status: Former Smoker   • Smokeless tobacco: Never Used   • Tobacco comment: smoked less than 1 pack per day for 15 years   Substance and Sexual Activity   • Alcohol use: Yes     Comment: occasionally-7 or less drinks per week   • Drug use: Yes     Types: Marijuana       Current Outpatient Medications   Medication Sig Dispense Refill   • diclofenac (VOLTAREN) 75 MG EC tablet Take 1 tablet by mouth 2 (Two) Times a Day. 180 tablet 1   • DULoxetine (CYMBALTA) 60 MG capsule TK 1 C PO BID  5   • fluticasone (FLONASE) 50 MCG/ACT nasal spray 1 spray into the nostril(s) as directed by provider.     • lisinopril-hydrochlorothiazide (PRINZIDE,ZESTORETIC) 10-12.5 MG per tablet Take 1 tablet by mouth Daily. 90 tablet 1   • metoprolol succinate XL (TOPROL-XL) 100 MG 24 hr tablet Take 100 mg by mouth Daily.  1   • pregabalin (LYRICA) 100 MG capsule TAKE ONE CAPSULE BY MOUTH THREE TIMES DAILY 90 capsule 2   • ipratropium (ATROVENT) 0.06 % nasal spray 2 sprays into the nostril(s) as directed by provider 4 (Four) Times a Day. 1 each 12   • Testosterone Cypionate (DEPO-TESTOSTERONE) 200 MG/ML injection Inject 1 mL into the appropriate muscle as directed by prescriber Every 14 (Fourteen) Days. 1 mL 3     No current facility-administered medications for this visit.        Review of Systems   Constitutional: Negative for activity change, appetite change, fatigue, fever, unexpected weight gain and  unexpected weight loss.   HENT: Negative for nosebleeds, rhinorrhea, trouble swallowing and voice change.    Eyes: Negative for visual disturbance.   Respiratory: Negative for cough, chest tightness, shortness of breath and wheezing.    Cardiovascular: Negative for chest pain, palpitations and leg swelling.   Gastrointestinal: Negative for abdominal pain, blood in stool, constipation, diarrhea, nausea, vomiting, GERD and indigestion.   Genitourinary: Negative for dysuria, frequency and hematuria.   Musculoskeletal: Negative for arthralgias, back pain and myalgias.   Skin: Negative for rash and bruise.   Neurological: Negative for dizziness, tremors, weakness, light-headedness, numbness, headache and memory problem.   Hematological: Negative for adenopathy. Does not bruise/bleed easily.   Psychiatric/Behavioral: Negative for sleep disturbance and depressed mood. The patient is not nervous/anxious.        Objective   Vitals:    12/05/19 0905   BP: 132/86   Pulse: 76   Temp: 97.8 °F (36.6 °C)   SpO2: 98%     Body mass index is 45.3 kg/m².  Physical Exam   Constitutional: He is oriented to person, place, and time. He appears well-developed and well-nourished. No distress.   HENT:   Head: Normocephalic and atraumatic.   Right Ear: External ear normal.   Left Ear: External ear normal.   Nose: Nose normal.   Mouth/Throat: Oropharynx is clear and moist.   Left TM with posterior fluid but no erythema or perforation.   Eyes: Pupils are equal, round, and reactive to light. Conjunctivae and EOM are normal.   Neck: Normal range of motion. Neck supple. No tracheal deviation present. No thyromegaly present.   Cardiovascular: Normal rate, regular rhythm, normal heart sounds and intact distal pulses. Exam reveals no gallop and no friction rub.   No murmur heard.  Pulmonary/Chest: Effort normal and breath sounds normal. No respiratory distress.   Abdominal: Soft. Bowel sounds are normal. He exhibits no mass. There is no tenderness.  There is no guarding.   Musculoskeletal: Normal range of motion. He exhibits no edema.   Lymphadenopathy:     He has no cervical adenopathy.   Neurological: He is alert and oriented to person, place, and time. He displays normal reflexes. He exhibits normal muscle tone.   Skin: Skin is warm and dry. Capillary refill takes less than 2 seconds. No rash noted. He is not diaphoretic.   Psychiatric: He has a normal mood and affect. His behavior is normal. Judgment and thought content normal.   Nursing note and vitals reviewed.      Recent Results (from the past 2016 hour(s))   COMPREHENSIVE METABOLIC PANEL    Collection Time: 11/11/19  9:16 AM   Result Value Ref Range    Sodium 138 137 - 145 mmol/L    Potassium 4.2 3.5 - 5.1 mmol/L    Chloride 104 98 - 107 mmol/L    Total CO2 24 22 - 30 mmol/L    Glucose 175 (H) 74 - 99 mg/dL    BUN 18 7 - 20 mg/dL    Creatinine 0.9 0.7 - 1.5 mg/dL    BUN/Creatinine Ratio 20.0 RATIO    Est GFR by Clearance >60 >60 /1.73 m2    Total Protein 7.7 6.3 - 8.2 g/dL    Albumin 4.0 3.5 - 5.0 g/dL    Globulin 3.7 1.5 - 4.5 g/dL    A/G Ratio 1.1 1.1 - 2.5 RATIO    Calcium 9.2 8.4 - 10.2 mg/dL    Total Bilirubin 0.3 0.2 - 1.3 mg/dL    AST (SGOT) 24 15 - 46 U/L    ALT (SGPT) 34 13 - 69 U/L    Alkaline Phosphatase 86 38 - 126 U/L   PROTEIN ELECTROPHORESIS, TOTAL    Collection Time: 11/11/19  9:16 AM   Result Value Ref Range    Comment       See Results and Interpretation under Pathology Tab.   IMMUNOFIXATION SERUM    Collection Time: 11/11/19  9:16 AM   Result Value Ref Range    Interpretation       See Results and Interpretation under Pathology Tab.    IgG 1,201 694 - 1,618 mg/dL    IgA 374 68 - 378 mg/dL    IgM 63 60 - 263 mg/dL   CBC AND DIFFERENTIAL    Collection Time: 11/11/19  9:16 AM   Result Value Ref Range    WBC 8.48 4.5 - 11.0 10*3/uL    RBC 5.65 4.5 - 5.9 10*6/uL    Hemoglobin 16.3 13.5 - 17.5 g/dL    Hematocrit 48.4 41.0 - 53.0 %    MCV 85.7 80.0 - 100.0 fL    MCH 28.8 26.0 - 34.0 pg     MCHC 33.7 31.0 - 37.0 g/dL    RDW 14.0 12.0 - 16.8 %    Platelets 271 140 - 440 10*3/uL    MPV 10.1 6.7 - 10.8 fL    Neutrophil Rel % 64.8 45 - 80 %    Lymphocyte Rel % 22.8 15 - 50 %    Monocyte Rel % 7.4 0 - 15 %    Eosinophil % 4.4 0 - 7 %    Basophil Rel % 0.6 0 - 2 %    Neutrophils Absolute 5.50 2.0 - 8.8 10*3/uL    Lymphocytes Absolute 1.93 0.7 - 5.5 10*3/uL    Monocytes Absolute 0.63 0.0 - 1.7 10*3/uL    Eosinophils Absolute 0.37 0.0 - 0.8 10*3/uL    Basophils Absolute 0.05 0.0 - 0.2 10*3/uL   IMMUNOGLOBULIN FREE LT CHAINS BLOOD    Collection Time: 11/11/19  9:16 AM   Result Value Ref Range    Kappa FLC 18.56 3.30 - 19.40 mg/L    Urine Free Lambda Excretion/Day 12.86 5.71 - 26.30 mg/L    Kappa/Lambda FluidC Ratio 1.44 0.26 - 1.65 mg/L     Assessment/Plan   Eduardo was seen today for ear problem and sinus problem.    Diagnoses and all orders for this visit:    Vasomotor rhinitis  -     ipratropium (ATROVENT) 0.06 % nasal spray; 2 sprays into the nostril(s) as directed by provider 4 (Four) Times a Day.    Acute serous otitis media of left ear without rupture    Arthritis  -     diclofenac (VOLTAREN) 75 MG EC tablet; Take 1 tablet by mouth 2 (Two) Times a Day.    Hypogonadism male  -     Testosterone Cypionate (DEPO-TESTOSTERONE) 200 MG/ML injection; Inject 1 mL into the appropriate muscle as directed by prescriber Every 14 (Fourteen) Days.    Need for immunization against influenza  -     Fluarix/Fluzone/Afluria/FluLaval Quad ([id number])    Class 3 severe obesity without serious comorbidity with body mass index (BMI) of 45.0 to 49.9 in adult, unspecified obesity type (CMS/HCC)    Colon cancer screening  -     Ambulatory Referral For Screening Colonoscopy    Saline spray for the nose.  Continue the flonase nasal spray daily.  Will add ipratropium nasal spray for the drainage.  Will restart the testosterone injections monthly and repeat the labs in 3 months.  Flu shot today as is agreeable and will schedule  follow up colonoscopy.  Continue the current medications otherwise at this time and follow up in 3 months.  ROBERTA run and reviewed.  Risks of the medication include but are not limited to prostate issues and mood issues.

## 2019-12-05 NOTE — PATIENT INSTRUCTIONS
Exercising to Lose Weight  Exercise is structured, repetitive physical activity to improve fitness and health. Getting regular exercise is important for everyone. It is especially important if you are overweight. Being overweight increases your risk of heart disease, stroke, diabetes, high blood pressure, and several types of cancer. Reducing your calorie intake and exercising can help you lose weight.  Exercise is usually categorized as moderate or vigorous intensity. To lose weight, most people need to do a certain amount of moderate-intensity or vigorous-intensity exercise each week.  Moderate-intensity exercise    Moderate-intensity exercise is any activity that gets you moving enough to burn at least three times more energy (calories) than if you were sitting.  Examples of moderate exercise include:  · Walking a mile in 15 minutes.  · Doing light yard work.  · Biking at an easy pace.  Most people should get at least 150 minutes (2 hours and 30 minutes) a week of moderate-intensity exercise to maintain their body weight.  Vigorous-intensity exercise  Vigorous-intensity exercise is any activity that gets you moving enough to burn at least six times more calories than if you were sitting. When you exercise at this intensity, you should be working hard enough that you are not able to carry on a conversation.  Examples of vigorous exercise include:  · Running.  · Playing a team sport, such as football, basketball, and soccer.  · Jumping rope.  Most people should get at least 75 minutes (1 hour and 15 minutes) a week of vigorous-intensity exercise to maintain their body weight.  How can exercise affect me?  When you exercise enough to burn more calories than you eat, you lose weight. Exercise also reduces body fat and builds muscle. The more muscle you have, the more calories you burn. Exercise also:  · Improves mood.  · Reduces stress and tension.  · Improves your overall fitness, flexibility, and  endurance.  · Increases bone strength.  The amount of exercise you need to lose weight depends on:  · Your age.  · The type of exercise.  · Any health conditions you have.  · Your overall physical ability.  Talk to your health care provider about how much exercise you need and what types of activities are safe for you.  What actions can I take to lose weight?  Nutrition    · Make changes to your diet as told by your health care provider or diet and nutrition specialist (dietitian). This may include:  ? Eating fewer calories.  ? Eating more protein.  ? Eating less unhealthy fats.  ? Eating a diet that includes fresh fruits and vegetables, whole grains, low-fat dairy products, and lean protein.  ? Avoiding foods with added fat, salt, and sugar.  · Drink plenty of water while you exercise to prevent dehydration or heat stroke.  Activity  · Choose an activity that you enjoy and set realistic goals. Your health care provider can help you make an exercise plan that works for you.  · Exercise at a moderate or vigorous intensity most days of the week.  ? The intensity of exercise may vary from person to person. You can tell how intense a workout is for you by paying attention to your breathing and heartbeat. Most people will notice their breathing and heartbeat get faster with more intense exercise.  · Do resistance training twice each week, such as:  ? Push-ups.  ? Sit-ups.  ? Lifting weights.  ? Using resistance bands.  · Getting short amounts of exercise can be just as helpful as long structured periods of exercise. If you have trouble finding time to exercise, try to include exercise in your daily routine.  ? Get up, stretch, and walk around every 30 minutes throughout the day.  ? Go for a walk during your lunch break.  ? Park your car farther away from your destination.  ? If you take public transportation, get off one stop early and walk the rest of the way.  ? Make phone calls while standing up and walking  around.  ? Take the stairs instead of elevators or escalators.  · Wear comfortable clothes and shoes with good support.  · Do not exercise so much that you hurt yourself, feel dizzy, or get very short of breath.  Where to find more information  · [place] Department of Health and Human Services: www.hhs.gov  · Centers for Disease Control and Prevention (CDC): www.cdc.gov  Contact a health care provider:  · Before starting a new exercise program.  · If you have questions or concerns about your weight.  · If you have a medical problem that keeps you from exercising.  Get help right away if you have any of the following while exercising:  · Injury.  · Dizziness.  · Difficulty breathing or shortness of breath that does not go away when you stop exercising.  · Chest pain.  · Rapid heartbeat.  Summary  · Being overweight increases your risk of heart disease, stroke, diabetes, high blood pressure, and several types of cancer.  · Losing weight happens when you burn more calories than you eat.  · Reducing the amount of calories you eat in addition to getting regular moderate or vigorous exercise each week helps you lose weight.  This information is not intended to replace advice given to you by your health care provider. Make sure you discuss any questions you have with your health care provider.  Document Released: 01/20/2012 Document Revised: 12/31/2018 Document Reviewed: 12/31/2018  [employer] Interactive Patient Education © 2019 [employer]      Calorie Counting for Weight Loss  Calories are units of energy. Your body needs a certain amount of calories from food to keep you going throughout the day. When you eat more calories than your body needs, your body stores the extra calories as fat. When you eat fewer calories than your body needs, your body burns fat to get the energy it needs.  Calorie counting means keeping track of how many calories you eat and drink each day. Calorie counting can be helpful if you need to lose  weight. If you make sure to eat fewer calories than your body needs, you should lose weight. Ask your health care provider what a healthy weight is for you.  For calorie counting to work, you will need to eat the right number of calories in a day in order to lose a healthy amount of weight per week. A dietitian can help you determine how many calories you need in a day and will give you suggestions on how to reach your calorie goal.  · A healthy amount of weight to lose per week is usually 1-2 lb (0.5-0.9 kg). This usually means that your daily calorie intake should be reduced by 500-750 calories.  · Eating 1,200 - 1,500 calories per day can help most women lose weight.  · Eating 1,500 - 1,800 calories per day can help most men lose weight.  What is my plan?  My goal is to have __________ calories per day.  If I have this many calories per day, I should lose around __________ pounds per week.  What do I need to know about calorie counting?  In order to meet your daily calorie goal, you will need to:  · Find out how many calories are in each food you would like to eat. Try to do this before you eat.  · Decide how much of the food you plan to eat.  · Write down what you ate and how many calories it had. Doing this is called keeping a food log.  To successfully lose weight, it is important to balance calorie counting with a healthy lifestyle that includes regular activity. Aim for 150 minutes of moderate exercise (such as walking) or 75 minutes of vigorous exercise (such as running) each week.  Where do I find calorie information?    The number of calories in a food can be found on a Nutrition Facts label. If a food does not have a Nutrition Facts label, try to look up the calories online or ask your dietitian for help.  Remember that calories are listed per serving. If you choose to have more than one serving of a food, you will have to multiply the calories per serving by the amount of servings you plan to eat. For  "example, the label on a package of bread might say that a serving size is 1 slice and that there are 90 calories in a serving. If you eat 1 slice, you will have eaten 90 calories. If you eat 2 slices, you will have eaten 180 calories.  How do I keep a food log?  Immediately after each meal, record the following information in your food log:  · What you ate. Don't forget to include toppings, sauces, and other extras on the food.  · How much you ate. This can be measured in cups, ounces, or number of items.  · How many calories each food and drink had.  · The total number of calories in the meal.  Keep your food log near you, such as in a small notebook in your pocket, or use a mobile hima or website. Some programs will calculate calories for you and show you how many calories you have left for the day to meet your goal.  What are some calorie counting tips?    · Use your calories on foods and drinks that will fill you up and not leave you hungry:  ? Some examples of foods that fill you up are nuts and nut butters, vegetables, lean proteins, and high-fiber foods like whole grains. High-fiber foods are foods with more than 5 g fiber per serving.  ? Drinks such as sodas, specialty coffee drinks, alcohol, and juices have a lot of calories, yet do not fill you up.  · Eat nutritious foods and avoid empty calories. Empty calories are calories you get from foods or beverages that do not have many vitamins or protein, such as candy, sweets, and soda. It is better to have a nutritious high-calorie food (such as an avocado) than a food with few nutrients (such as a bag of chips).  · Know how many calories are in the foods you eat most often. This will help you calculate calorie counts faster.  · Pay attention to calories in drinks. Low-calorie drinks include water and unsweetened drinks.  · Pay attention to nutrition labels for \"low fat\" or \"fat free\" foods. These foods sometimes have the same amount of calories or more calories " than the full fat versions. They also often have added sugar, starch, or salt, to make up for flavor that was removed with the fat.  · Find a way of tracking calories that works for you. Get creative. Try different apps or programs if writing down calories does not work for you.  What are some portion control tips?  · Know how many calories are in a serving. This will help you know how many servings of a certain food you can have.  · Use a measuring cup to measure serving sizes. You could also try weighing out portions on a kitchen scale. With time, you will be able to estimate serving sizes for some foods.  · Take some time to put servings of different foods on your favorite plates, bowls, and cups so you know what a serving looks like.  · Try not to eat straight from a bag or box. Doing this can lead to overeating. Put the amount you would like to eat in a cup or on a plate to make sure you are eating the right portion.  · Use smaller plates, glasses, and bowls to prevent overeating.  · Try not to multitask (for example, watch TV or use your computer) while eating. If it is time to eat, sit down at a table and enjoy your food. This will help you to know when you are full. It will also help you to be aware of what you are eating and how much you are eating.  What are tips for following this plan?  Reading food labels  · Check the calorie count compared to the serving size. The serving size may be smaller than what you are used to eating.  · Check the source of the calories. Make sure the food you are eating is high in vitamins and protein and low in saturated and trans fats.  Shopping  · Read nutrition labels while you shop. This will help you make healthy decisions before you decide to purchase your food.  · Make a grocery list and stick to it.  Cooking  · Try to cook your favorite foods in a healthier way. For example, try baking instead of frying.  · Use low-fat dairy products.  Meal planning  · Use more fruits  "and vegetables. Half of your plate should be fruits and vegetables.  · Include lean proteins like poultry and fish.  How do I count calories when eating out?  · Ask for smaller portion sizes.  · Consider sharing an entree and sides instead of getting your own entree.  · If you get your own entree, eat only half. Ask for a box at the beginning of your meal and put the rest of your entree in it so you are not tempted to eat it.  · If calories are listed on the menu, choose the lower calorie options.  · Choose dishes that include vegetables, fruits, whole grains, low-fat dairy products, and lean protein.  · Choose items that are boiled, broiled, grilled, or steamed. Stay away from items that are buttered, battered, fried, or served with cream sauce. Items labeled \"crispy\" are usually fried, unless stated otherwise.  · Choose water, low-fat milk, unsweetened iced tea, or other drinks without added sugar. If you want an alcoholic beverage, choose a lower calorie option such as a glass of wine or light beer.  · Ask for dressings, sauces, and syrups on the side. These are usually high in calories, so you should limit the amount you eat.  · If you want a salad, choose a garden salad and ask for grilled meats. Avoid extra toppings like tovar, cheese, or fried items. Ask for the dressing on the side, or ask for olive oil and vinegar or lemon to use as dressing.  · Estimate how many servings of a food you are given. For example, a serving of cooked rice is ½ cup or about the size of half a baseball. Knowing serving sizes will help you be aware of how much food you are eating at restaurants. The list below tells you how big or small some common portion sizes are based on everyday objects:  ? 1 oz--4 stacked dice.  ? 3 oz--1 deck of cards.  ? 1 tsp--1 die.  ? 1 Tbsp--½ a ping-pong ball.  ? 2 Tbsp--1 ping-pong ball.  ? ½ cup--½ baseball.  ? 1 cup--1 baseball.  Summary  · Calorie counting means keeping track of how many calories " you eat and drink each day. If you eat fewer calories than your body needs, you should lose weight.  · A healthy amount of weight to lose per week is usually 1-2 lb (0.5-0.9 kg). This usually means reducing your daily calorie intake by 500-750 calories.  · The number of calories in a food can be found on a Nutrition Facts label. If a food does not have a Nutrition Facts label, try to look up the calories online or ask your dietitian for help.  · Use your calories on foods and drinks that will fill you up, and not on foods and drinks that will leave you hungry.  · Use smaller plates, glasses, and bowls to prevent overeating.  This information is not intended to replace advice given to you by your health care provider. Make sure you discuss any questions you have with your health care provider.  Document Released: 12/18/2006 Document Revised: 09/06/2019 Document Reviewed: 11/17/2017  [employer] Interactive Patient Education © 2019 [employer]

## 2019-12-06 ENCOUNTER — HOSPITAL ENCOUNTER (EMERGENCY)
Facility: HOSPITAL | Age: 59
Discharge: HOME | End: 2019-12-06
Attending: EMERGENCY MEDICINE | Admitting: EMERGENCY MEDICINE

## 2019-12-06 ENCOUNTER — APPOINTMENT (OUTPATIENT)
Dept: GENERAL RADIOLOGY | Facility: HOSPITAL | Age: 59
End: 2019-12-06

## 2019-12-06 VITALS
TEMPERATURE: 97.6 F | RESPIRATION RATE: 16 BRPM | OXYGEN SATURATION: 94 % | HEIGHT: 72 IN | HEART RATE: 70 BPM | SYSTOLIC BLOOD PRESSURE: 149 MMHG | DIASTOLIC BLOOD PRESSURE: 99 MMHG | BODY MASS INDEX: 42.66 KG/M2 | WEIGHT: 315 LBS

## 2019-12-06 DIAGNOSIS — M25.511 ACUTE PAIN OF RIGHT SHOULDER: Primary | ICD-10-CM

## 2019-12-06 PROCEDURE — 73030 X-RAY EXAM OF SHOULDER: CPT

## 2019-12-06 PROCEDURE — 99283 EMERGENCY DEPT VISIT LOW MDM: CPT

## 2019-12-06 RX ORDER — OXYCODONE HYDROCHLORIDE AND ACETAMINOPHEN 5; 325 MG/1; MG/1
2 TABLET ORAL ONCE
Status: COMPLETED | OUTPATIENT
Start: 2019-12-06 | End: 2019-12-06

## 2019-12-06 RX ADMIN — OXYCODONE AND ACETAMINOPHEN 2 TABLET: 5; 325 TABLET ORAL at 20:28

## 2019-12-07 NOTE — ED TRIAGE NOTES
To ER via [facility].  C/o rt shoulder pain.  Pt originally injured rt shoulder when he fell at work on 12/1/19.  Dx with a sprain at that time.  Tonight when pt was taking a shower, he reached around and states shoulder felt like it popped.  States it felt like it popped out.  States increase in pain at this time.

## 2019-12-07 NOTE — ED PROVIDER NOTES
EMERGENCY DEPARTMENT ENCOUNTER    Room Number:  07/07  Date of encounter:  12/6/2019  PCP: Stan Calderon MD  Historian: Patient, wife      HPI:  Chief Complaint: Right shoulder pain  A complete HPI/ROS/PMH/PSH/SH/FH are unobtainable due to: None    Context: Eduardo Ramires is a 59 y.o. male who presents to the ED c/o right shoulder pain.  Patient had a fall several days ago and reached down and caught himself with the right arm causing right shoulder pain.  He was seen and had x-rays which did not reveal a fracture or dislocation.  He was treated with a sling and pain was generally improving over the next several last several days.  Tonight he was in the shower when he reached up and felt a pop like his arm dislocated.  The pain became acutely much more severe.  Pain is now better, rated as moderate.  Pain is worsened with movement.  There is no numbness or tingling in the right arm.  Patient has no neck pain.      PAST MEDICAL HISTORY  Active Ambulatory Problems     Diagnosis Date Noted   • Allergic rhinitis 08/26/2019   • Anal fistula 08/26/2019   • Arthritis 08/26/2019   • BPH (benign prostatic hyperplasia) 08/26/2019   • Degeneration, intervertebral disc, thoracic 08/26/2019   • Degenerative joint disease 08/26/2019   • Diabetic polyneuropathy (CMS/[facility]) 08/26/2019   • Hypertension 08/26/2019   • Erectile dysfunction 08/26/2019   • Hypogonadism male 08/26/2019   • Type 2 diabetes mellitus (CMS/[facility]) 08/26/2019   • Peripheral neuropathy 08/26/2019   • Right knee pain 08/26/2019   • Thoracic back pain 08/26/2019   • BMI 40.0-44.9, adult (CMS/[facility]) 08/26/2019   • Otitis media, serous, acute, without rupture 12/05/2019   • Rhinitis 12/05/2019   • Colon cancer screening 12/05/2019     Resolved Ambulatory Problems     Diagnosis Date Noted   • No Resolved Ambulatory Problems     Past Medical History:   Diagnosis Date   • Allergic rhinitis 8/26/2019   • Anal fistula 8/26/2019   • Arthritis 8/26/2019   • BMI  45.0-49.9, adult (CMS/[facility])    • BPH (benign prostatic hyperplasia) 8/26/2019   • Degeneration, intervertebral disc, thoracic 8/26/2019   • Degenerative joint disease 8/26/2019   • Degenerative joint disease of left hip    • Diabetic polyneuropathy (CMS/[facility]) 8/26/2019   • Elevated blood sugar    • Erectile dysfunction 8/26/2019   • High blood pressure    • History of kidney stones    • Hypertension 8/26/2019   • Hypogonadism male 8/26/2019   • Left hip pain    • Nephrolithiasis    • Peripheral neuropathy 8/26/2019   • Preoperative clearance    • Refused influenza vaccine    • Right knee pain 8/26/2019   • Synovial cyst popliteal space    • Thoracic back pain 8/26/2019   • Type 2 diabetes mellitus (CMS/[facility]) 8/26/2019         PAST SURGICAL HISTORY  Past Surgical History:   Procedure Laterality Date   • BACK SURGERY      Lower Back Surgery   • COLONOSCOPY  2010    normal   • KIDNEY STONE SURGERY  2016    laser stone surgery   • LUMBAR SPINE SURGERY  1989   • SHOULDER ROTATOR CUFF REPAIR Left 2012   • TOTAL HIP ARTHROPLASTY Left 12/10/2018    Dr Barraza         FAMILY HISTORY  Family History   Problem Relation Age of Onset   • Valvular heart disease Mother    • Arthritis Father    • Hypertension Father    • Diabetes type II Father    • No Known Problems Other          SOCIAL HISTORY  Social History     Socioeconomic History   • Marital status:      Spouse name: Not on file   • Number of children: Not on file   • Years of education: Not on file   • Highest education level: Not on file   Tobacco Use   • Smoking status: Former Smoker   • Smokeless tobacco: Never Used   • Tobacco comment: smoked less than 1 pack per day for 15 years   Substance and Sexual Activity   • Alcohol use: Yes     Comment: occasionally-7 or less drinks per week   • Drug use: Yes     Types: Marijuana         ALLERGIES  Patient has no known allergies.       REVIEW OF SYSTEMS  Review of Systems   Constitutional: Negative for fever.   Respiratory:  Negative for shortness of breath.    Cardiovascular: Negative for chest pain.           PHYSICAL EXAM    I have reviewed the triage vital signs and nursing notes.    ED Triage Vitals   Temp Heart Rate Resp BP SpO2   12/06/19 1929 12/06/19 1929 12/06/19 1929 12/06/19 1938 12/06/19 1929   97.6 °F (36.4 °C) 74 16 122/75 98 %      Temp src Heart Rate Source Patient Position BP Location FiO2 (%)   12/06/19 1929 12/06/19 1929 -- -- --   Tympanic Monitor          Physical Exam  GENERAL: Mildly distressed  HENT: nares patent  EYES: no scleral icterus  CV: regular rhythm, regular rate  RESPIRATORY: normal effort, unlabored  ABDOMEN: soft  MUSCULOSKELETAL: Right shoulder there is mild tenderness to palpation, there is no obvious deformity  NEURO: Strength, sensation, and coordination are grossly intact.  Speech and mentation are unremarkable  SKIN: warm, dry      LAB RESULTS  No results found for this or any previous visit (from the past 24 hour(s)).    Ordered the above labs and independently reviewed the results.      RADIOLOGY  Xr Shoulder 2+ View Right    Result Date: 12/6/2019  XR SHOULDER 2+ VW RIGHT-  Clinical: Shoulder pain, dislocated shoulder one week ago, evaluate for dislocation  FINDINGS: There is acromioclavicular joint space narrowing with bone hypertrophy. The glenohumeral joint alignment is satisfactory, there is no indication of dislocation. Glenohumeral joint width is preserved. No fracture identified. The overlying soft tissues have a satisfactory appearance.  CONCLUSION: Glenohumeral joint alignment is satisfactory, similar to the previous examination, no indication of dislocation.  This report was finalized on 12/6/2019 8:56 PM by Dr. Gustavo Marie M.D.        I ordered the above noted radiological studies. Reviewed by me and discussed with radiologist.  See dictation for official radiology interpretation.      PROCEDURES  Procedures      MEDICATIONS GIVEN IN ER    Medications   oxyCODONE-acetaminophen  (PERCOCET) 5-325 MG per tablet 2 tablet (2 tablets Oral Given 12/6/19 2028)         PROGRESS, DATA ANALYSIS, CONSULTS, AND MEDICAL DECISION MAKING    All labs have been independently reviewed by me.  All radiology studies have been reviewed by me and discussed with radiologist dictating the report.   EKG's independently viewed and interpreted by me.  Discussion below represents my analysis of pertinent findings related to patient's condition, differential diagnosis, treatment plan and final disposition.      ED Course as of Dec 06 2114   Fri Dec 06, 2019   2112 I reviewed the x-ray as interpreted by the radiologist.  There is no acute fracture nor dislocation noted.  Results were shared with patient at the bedside.  Given the patient has had worsening symptoms he will need to follow-up with Worker's Comp. for possible referral to orthopedics.  I will provide him a note for work specifying no use of the right arm until cleared by Worker's Comp. or orthopedics.  [DB]      ED Course User Index  [DB] Sammy Roberts MD       AS OF 9:14 PM VITALS:    BP - 122/75  HR - 74  TEMP - 97.6 °F (36.4 °C) (Tympanic)  O2 SATS - 98%      DIAGNOSIS  Final diagnoses:   Acute pain of right shoulder         DISPOSITION  DISCHARGE    Patient discharged in stable condition.    Reviewed implications of results, diagnosis, meds, responsibility to follow up, warning signs and symptoms of possible worsening, potential complications and reasons to return to ER, including increased pain, weakness/numbness or as needed.    Patient/Family voiced understanding of above instructions.    Discussed plan for discharge, as there is no emergent indication for admission. Patient referred to primary care provider for BP management due to today's BP. Pt/family is agreeable and understands need for follow up and repeat testing.  Pt is aware that discharge does not mean that nothing is wrong but it indicates no emergency is present that requires admission  and they must continue care with follow-up as given below or physician of their choice.     FOLLOW-UP  [facility] OCCUPATIONAL MEDICINE [place] [id number] [place] [address]  [phone]    Call for Appointment         Medication List      No changes were made to your prescriptions during this visit.                Sammy Roberts MD  12/06/19 [id number]

## 2019-12-07 NOTE — DISCHARGE INSTRUCTIONS
Continue use of sling.  Minimize movement of the right shoulder over the head.  's Comp. about further evaluation.

## 2020-02-15 ENCOUNTER — OFFICE VISIT (OUTPATIENT)
Dept: RETAIL CLINIC | Facility: CLINIC | Age: 60
End: 2020-02-15

## 2020-02-15 ENCOUNTER — TELEPHONE (OUTPATIENT)
Dept: FAMILY MEDICINE CLINIC | Facility: CLINIC | Age: 60
End: 2020-02-15

## 2020-02-15 VITALS
SYSTOLIC BLOOD PRESSURE: 122 MMHG | HEART RATE: 66 BPM | DIASTOLIC BLOOD PRESSURE: 82 MMHG | TEMPERATURE: 97.7 F | OXYGEN SATURATION: 97 %

## 2020-02-15 DIAGNOSIS — J02.9 ACUTE PHARYNGITIS, UNSPECIFIED ETIOLOGY: ICD-10-CM

## 2020-02-15 DIAGNOSIS — H65.112 ACUTE MUCOID OTITIS MEDIA OF LEFT EAR: Primary | ICD-10-CM

## 2020-02-15 PROCEDURE — 99213 OFFICE O/P EST LOW 20 MIN: CPT | Performed by: NURSE PRACTITIONER

## 2020-02-15 RX ORDER — CEFDINIR 300 MG/1
300 CAPSULE ORAL DAILY
Qty: 10 CAPSULE | Refills: 0 | Status: SHIPPED | OUTPATIENT
Start: 2020-02-15 | End: 2020-02-20

## 2020-02-15 NOTE — PROGRESS NOTES
"Chauncey Ramires is a 59 y.o. male.     History of Present Illness Pt. Reports left ear pain this AM and hearing is decreased, \"everything sounds muffled\". Also reports nasal congestion and frequent cotton mouth x 2weeks. Has never had to have ears cleaned out before but recently has noticed big \"chunks\" of ear wax coming out of left ear. Denies N/V/D, fever, and general malaise. Has not taken any medication OTC.      The following portions of the patient's history were reviewed and updated as appropriate: allergies, current medications, past family history, past medical history, past social history, past surgical history and problem list.    Review of Systems   Constitutional: Negative for appetite change, chills, diaphoresis, fatigue and fever.   HENT: Positive for congestion, ear pain (left ear only), postnasal drip and sore throat (\"cotton mouth\"). Negative for rhinorrhea, sinus pressure, sneezing and voice change.    Respiratory: Negative for cough, shortness of breath and wheezing.    Cardiovascular: Negative.    Gastrointestinal: Negative.    Musculoskeletal: Negative.    Allergic/Immunologic: Positive for environmental allergies.   Neurological: Negative.        Objective   Physical Exam   Constitutional: He is oriented to person, place, and time. He appears well-developed and well-nourished. No distress.   HENT:   Head: Normocephalic and atraumatic.   Right Ear: Hearing, external ear and ear canal normal. Tympanic membrane is erythematous and bulging. No middle ear effusion.   Left Ear: External ear and ear canal normal. Tympanic membrane is erythematous and bulging.  No middle ear effusion. Decreased hearing is noted.   Ears:    Nose: Congestion present. No rhinorrhea. Right sinus exhibits no maxillary sinus tenderness and no frontal sinus tenderness. Left sinus exhibits no maxillary sinus tenderness and no frontal sinus tenderness.   Mouth/Throat: Uvula is midline. Mucous membranes are pale " and dry. Uvula swelling (white exudate present) present. Oropharyngeal exudate (moderate clear postnasal drip), posterior oropharyngeal edema and posterior oropharyngeal erythema present. Tonsils are 2+ on the right. Tonsils are 2+ on the left. Tonsillar exudate.   Eyes: Pupils are equal, round, and reactive to light. Conjunctivae and EOM are normal.   Neck: Normal range of motion.   Cardiovascular: Normal rate, regular rhythm and normal heart sounds. Exam reveals no gallop and no friction rub.   No murmur heard.  Pulmonary/Chest: Effort normal and breath sounds normal. No respiratory distress.   Lymphadenopathy:        Head (right side): Tonsillar adenopathy present. No submental, no submandibular, no preauricular and no posterior auricular adenopathy present.        Head (left side): Tonsillar adenopathy present. No submental, no submandibular, no preauricular and no posterior auricular adenopathy present.     He has no cervical adenopathy.   Neurological: He is alert and oriented to person, place, and time.   Skin: Skin is warm and dry. He is not diaphoretic.   Psychiatric: He has a normal mood and affect.         Assessment/Plan   Diagnoses and all orders for this visit:    Acute mucoid otitis media of left ear  -     cefdinir (OMNICEF) 300 MG capsule; Take 1 capsule by mouth Daily for 10 days.    Acute pharyngitis, unspecified etiology  -     cefdinir (OMNICEF) 300 MG capsule; Take 1 capsule by mouth Daily for 10 days.        Instructed pt. To follow-up with PCP if no improvement in symptoms.

## 2020-02-15 NOTE — TELEPHONE ENCOUNTER
Patient called answering service with c/o left ear pain; symptoms started this morning; has had nasal symptoms for last several months; has tried to clear sinuses at times and gets thick secretions; no nasal drainage; no fever; instructed to try Ibuprofen or Tylenol for discomfort; instructed to use Flonase daily and may try Coricidin HBP; discussed [employer] [facility] Steve open until 6 p.m. today if symptoms persist.

## 2020-02-20 ENCOUNTER — OFFICE VISIT (OUTPATIENT)
Dept: FAMILY MEDICINE CLINIC | Facility: CLINIC | Age: 60
End: 2020-02-20

## 2020-02-20 VITALS
DIASTOLIC BLOOD PRESSURE: 88 MMHG | HEIGHT: 72 IN | OXYGEN SATURATION: 98 % | TEMPERATURE: 98.8 F | BODY MASS INDEX: 42.66 KG/M2 | HEART RATE: 91 BPM | WEIGHT: 315 LBS | SYSTOLIC BLOOD PRESSURE: 130 MMHG

## 2020-02-20 DIAGNOSIS — I10 ESSENTIAL HYPERTENSION: ICD-10-CM

## 2020-02-20 DIAGNOSIS — E29.1 HYPOGONADISM MALE: ICD-10-CM

## 2020-02-20 DIAGNOSIS — E11.42 DIABETIC POLYNEUROPATHY ASSOCIATED WITH TYPE 2 DIABETES MELLITUS (HCC): ICD-10-CM

## 2020-02-20 DIAGNOSIS — Z11.59 ENCOUNTER FOR HEPATITIS C SCREENING TEST FOR LOW RISK PATIENT: ICD-10-CM

## 2020-02-20 DIAGNOSIS — E11.40 TYPE 2 DIABETES MELLITUS WITH DIABETIC NEUROPATHY, WITHOUT LONG-TERM CURRENT USE OF INSULIN (HCC): Primary | ICD-10-CM

## 2020-02-20 DIAGNOSIS — H65.112 ACUTE MUCOID OTITIS MEDIA OF LEFT EAR: ICD-10-CM

## 2020-02-20 LAB
BILIRUB BLD-MCNC: NEGATIVE MG/DL
CLARITY, POC: CLEAR
COLOR UR: ABNORMAL
GLUCOSE UR STRIP-MCNC: ABNORMAL MG/DL
KETONES UR QL: ABNORMAL
LEUKOCYTE EST, POC: NEGATIVE
NITRITE UR-MCNC: NEGATIVE MG/ML
PH UR: 6 [PH] (ref 5–8)
POC CREATININE URINE: 100
POC MICROALBUMIN URINE: 80
PROT UR STRIP-MCNC: NEGATIVE MG/DL
RBC # UR STRIP: NEGATIVE /UL
SP GR UR: 1.03 (ref 1–1.03)
UROBILINOGEN UR QL: NORMAL

## 2020-02-20 PROCEDURE — 99214 OFFICE O/P EST MOD 30 MIN: CPT | Performed by: INTERNAL MEDICINE

## 2020-02-20 PROCEDURE — 82044 UR ALBUMIN SEMIQUANTITATIVE: CPT | Performed by: INTERNAL MEDICINE

## 2020-02-20 PROCEDURE — 81003 URINALYSIS AUTO W/O SCOPE: CPT | Performed by: INTERNAL MEDICINE

## 2020-02-20 RX ORDER — TESTOSTERONE CYPIONATE 200 MG/ML
200 INJECTION, SOLUTION INTRAMUSCULAR
Qty: 2 ML | Refills: 4 | Status: SHIPPED | OUTPATIENT
Start: 2020-02-20 | End: 2020-09-15

## 2020-02-20 RX ORDER — METOPROLOL SUCCINATE 100 MG/1
100 TABLET, EXTENDED RELEASE ORAL DAILY
Qty: 90 TABLET | Refills: 1 | Status: SHIPPED | OUTPATIENT
Start: 2020-02-20 | End: 2020-06-15 | Stop reason: SDUPTHER

## 2020-02-20 RX ORDER — CEFDINIR 300 MG/1
300 CAPSULE ORAL 2 TIMES DAILY
Qty: 14 CAPSULE | Refills: 0 | Status: SHIPPED | OUTPATIENT
Start: 2020-02-20 | End: 2020-08-08 | Stop reason: HOSPADM

## 2020-02-20 RX ORDER — METFORMIN HYDROCHLORIDE 500 MG/1
500 TABLET, EXTENDED RELEASE ORAL
Qty: 90 TABLET | Refills: 1 | Status: SHIPPED | OUTPATIENT
Start: 2020-02-20 | End: 2020-08-03

## 2020-02-20 NOTE — PROGRESS NOTES
Subjective   Eduardo Ramires is a 59 y.o. male.     Chief Complaint   Patient presents with   • Hypertension   • Hypogonadism   • Diabetes   • Sore Throat     f/u   • Otitis Media     f/u       History of Present Illness   Here for follow-up of diabetes.  Patient states to have been compliant with medications.  Blood sugar monitoring - patient states has not been followed.  No episodes of hypoglycemia, nausea, vomiting, new rashes, syncope or other issues.  Has been having dry mouth and urinary frequency.  Denies any difficulties with the current medication regimen of diet control only.  Follow-up for hypertension.  Currently has been feeling well and asymptomatic without any headaches,vision changes, cough, chest pain, shortness of breath, swelling, focal neurologic deficit, memory loss or syncope.  Has been taking the medications regularly and adherent with the regimen of lisinopril-hct 10-12.5 and metoprolol succinate 100mg.  Denies medication side effects and no significant interval events.   Follow-up for hypogonadism.  On Testosterone injections currently. States feels better on the testosterone but ran out and has been out for the last 4 weeks now.  No problems physically or with the mood while on it.  Patient was diagnosed with strep throat and ear infection.  Started on cefdinir.  Planned shoulder surgery with Dr Corazon gerard on 4/16/2020.    The following portions of the patient's history were reviewed and updated as appropriate: allergies, current medications, past family history, past medical history, past social history, past surgical history and problem list.    Depression Screen:  No flowsheet data found.    Past Medical History:   Diagnosis Date   • Allergic rhinitis 8/26/2019   • Anal fistula 8/26/2019   • Arthritis 8/26/2019   • BMI 45.0-49.9, adult (CMS/[facility])    • BPH (benign prostatic hyperplasia) 8/26/2019   • Degeneration, intervertebral disc, thoracic 8/26/2019   • Degenerative joint disease  8/26/2019   • Degenerative joint disease of left hip    • Diabetic polyneuropathy (CMS/[facility]) 8/26/2019   • Elevated blood sugar    • Erectile dysfunction 8/26/2019   • High blood pressure    • History of kidney stones    • Hypertension 8/26/2019   • Hypogonadism male 8/26/2019   • Left hip pain    • Nephrolithiasis    • Peripheral neuropathy 8/26/2019   • Preoperative clearance    • Refused influenza vaccine    • Right knee pain 8/26/2019   • Synovial cyst popliteal space    • Thoracic back pain 8/26/2019   • Type 2 diabetes mellitus (CMS/[facility]) 8/26/2019       Past Surgical History:   Procedure Laterality Date   • BACK SURGERY      Lower Back Surgery   • COLONOSCOPY  2010    normal   • KIDNEY STONE SURGERY  2016    laser stone surgery   • LUMBAR SPINE SURGERY  1989   • SHOULDER ROTATOR CUFF REPAIR Left 2012   • TOTAL HIP ARTHROPLASTY Left 12/10/2018    Dr Barraza       Family History   Problem Relation Age of Onset   • Valvular heart disease Mother    • Arthritis Father    • Hypertension Father    • Diabetes type II Father    • No Known Problems Other        Social History     Socioeconomic History   • Marital status:      Spouse name: Not on file   • Number of children: Not on file   • Years of education: Not on file   • Highest education level: Not on file   Tobacco Use   • Smoking status: Former Smoker   • Smokeless tobacco: Never Used   • Tobacco comment: smoked less than 1 pack per day for 15 years   Substance and Sexual Activity   • Alcohol use: Yes     Comment: occasionally-7 or less drinks per week   • Drug use: Yes     Types: Marijuana       Current Outpatient Medications   Medication Sig Dispense Refill   • diclofenac (VOLTAREN) 75 MG EC tablet Take 1 tablet by mouth 2 (Two) Times a Day. 180 tablet 1   • DULoxetine (CYMBALTA) 60 MG capsule TK 1 C PO BID  5   • fluticasone (FLONASE) 50 MCG/ACT nasal spray 1 spray into the nostril(s) as directed by provider.     • ipratropium (ATROVENT) 0.06 % nasal  spray 2 sprays into the nostril(s) as directed by provider 4 (Four) Times a Day. 1 each 12   • lisinopril-hydrochlorothiazide (PRINZIDE,ZESTORETIC) 10-12.5 MG per tablet Take 1 tablet by mouth Daily. 90 tablet 1   • metoprolol succinate XL (TOPROL-XL) 100 MG 24 hr tablet Take 1 tablet by mouth Daily. 90 tablet 1   • pregabalin (LYRICA) 100 MG capsule TAKE ONE CAPSULE BY MOUTH THREE TIMES DAILY 90 capsule 2   • Testosterone Cypionate (DEPO-TESTOSTERONE) 200 MG/ML injection Inject 1 mL into the appropriate muscle as directed by prescriber Every 14 (Fourteen) Days. 2 mL 4   • cefdinir (OMNICEF) 300 MG capsule Take 1 capsule by mouth 2 (Two) Times a Day. 14 capsule 0   • metFORMIN ER (GLUCOPHAGE-XR) 500 MG 24 hr tablet Take 1 tablet by mouth Daily With Breakfast. 90 tablet 1     No current facility-administered medications for this visit.        Review of Systems   Constitutional: Negative for activity change, appetite change, fatigue, fever, unexpected weight gain and unexpected weight loss.   HENT: Negative for nosebleeds, rhinorrhea, trouble swallowing and voice change.    Eyes: Negative for visual disturbance.   Respiratory: Negative for cough, chest tightness, shortness of breath and wheezing.    Cardiovascular: Negative for chest pain, palpitations and leg swelling.   Gastrointestinal: Negative for abdominal pain, blood in stool, constipation, diarrhea, nausea, vomiting, GERD and indigestion.   Genitourinary: Negative for dysuria, frequency and hematuria.   Musculoskeletal: Negative for arthralgias, back pain and myalgias.   Skin: Negative for rash and bruise.   Neurological: Negative for dizziness, tremors, weakness, light-headedness, numbness, headache and memory problem.   Hematological: Negative for adenopathy. Does not bruise/bleed easily.   Psychiatric/Behavioral: Negative for sleep disturbance and depressed mood. The patient is not nervous/anxious.        Objective   /88 (BP Location: Right arm,  "Patient Position: Sitting, Cuff Size: Large Adult)   Pulse 91   Temp 98.8 °F (37.1 °C) (Temporal)   Ht 182.9 cm (72.01\")   Wt (!) 150 kg (330 lb)   SpO2 98%   BMI 44.75 kg/m²     Physical Exam   Constitutional: He is oriented to person, place, and time. He appears well-developed and well-nourished. No distress.   HENT:   Head: Normocephalic and atraumatic.   Right Ear: External ear normal.   Left Ear: External ear normal.   Nose: Nose normal.   Mouth/Throat: Oropharynx is clear and moist.   Left TM red an OP mild red and dry.   Eyes: Pupils are equal, round, and reactive to light. Conjunctivae and EOM are normal.   Neck: Normal range of motion. Neck supple. No tracheal deviation present. No thyromegaly present.   Cardiovascular: Normal rate, regular rhythm, normal heart sounds and intact distal pulses. Exam reveals no gallop and no friction rub.   No murmur heard.  Pulmonary/Chest: Effort normal and breath sounds normal. No respiratory distress.   Abdominal: Soft. Bowel sounds are normal. He exhibits no mass. There is no tenderness. There is no guarding.   Obese abdomen   Musculoskeletal: Normal range of motion. He exhibits no edema.    Eduardo had a diabetic foot exam performed today.   During the foot exam he had a monofilament test performed.  Vascular Status -  His right foot exhibits normal foot vasculature  and no edema. His left foot exhibits normal foot vasculature  and no edema.  Skin Integrity  -  His right foot skin is intact.His left foot skin is intact..  Lymphadenopathy:     He has no cervical adenopathy.   Neurological: He is alert and oriented to person, place, and time. He displays normal reflexes. He exhibits normal muscle tone.   Skin: Skin is warm and dry. Capillary refill takes less than 2 seconds. No rash noted. He is not diaphoretic.   Psychiatric: He has a normal mood and affect. His behavior is normal. Judgment and thought content normal.   Nursing note and vitals reviewed.      No " results found for this or any previous visit (from the past 2016 hour(s)).  Assessment/Plan   Eduardo was seen today for hypertension, hypogonadism, diabetes, sore throat and otitis media.    Diagnoses and all orders for this visit:    Type 2 diabetes mellitus with diabetic neuropathy, without long-term current use of insulin (CMS/HCC)  -     metFORMIN ER (GLUCOPHAGE-XR) 500 MG 24 hr tablet; Take 1 tablet by mouth Daily With Breakfast.  -     Hemoglobin A1c  -     Comprehensive Metabolic Panel  -     Lipid Panel  -     POC Urinalysis Dipstick, Automated  -     POC Microalbumin    Essential hypertension  -     metoprolol succinate XL (TOPROL-XL) 100 MG 24 hr tablet; Take 1 tablet by mouth Daily.  -     Comprehensive Metabolic Panel  -     Lipid Panel    Hypogonadism male  -     Testosterone Cypionate (DEPO-TESTOSTERONE) 200 MG/ML injection; Inject 1 mL into the appropriate muscle as directed by prescriber Every 14 (Fourteen) Days.    Diabetic polyneuropathy associated with type 2 diabetes mellitus (CMS/HCC)    Acute mucoid otitis media of left ear  -     cefdinir (OMNICEF) 300 MG capsule; Take 1 capsule by mouth 2 (Two) Times a Day.    Encounter for hepatitis C screening test for low risk patient  -     Hepatitis C Antibody    Will restart the testosterone that he has not had at is every 14-day dosing.  Patient very symptomatic of uncontrolled diabetes with a dry mouth thirst and polyuria.  Will initiate metformin 500 mg once a day with breakfast.  Check the A1c today if it is very very high then we will consider increasing the metformin at that time.  Blood pressure is currently controlled continue the current medication unchanged.  Patient with persistent left otitis media that is new to me will add an additional 7 days of cefdinir twice a day.

## 2020-02-21 LAB
ALBUMIN SERPL-MCNC: 4.1 G/DL (ref 3.5–5.2)
ALBUMIN/GLOB SERPL: 1.2 G/DL
ALP SERPL-CCNC: 99 U/L (ref 39–117)
ALT SERPL-CCNC: 31 U/L (ref 1–41)
AST SERPL-CCNC: 17 U/L (ref 1–40)
BILIRUB SERPL-MCNC: 0.4 MG/DL (ref 0.2–1.2)
BUN SERPL-MCNC: 20 MG/DL (ref 6–20)
BUN/CREAT SERPL: 19.6 (ref 7–25)
CALCIUM SERPL-MCNC: 9.8 MG/DL (ref 8.6–10.5)
CHLORIDE SERPL-SCNC: 92 MMOL/L (ref 98–107)
CHOLEST SERPL-MCNC: 226 MG/DL (ref 0–200)
CO2 SERPL-SCNC: 22 MMOL/L (ref 22–29)
CREAT SERPL-MCNC: 1.02 MG/DL (ref 0.76–1.27)
GLOBULIN SER CALC-MCNC: 3.5 GM/DL
GLUCOSE SERPL-MCNC: 306 MG/DL (ref 65–99)
HBA1C MFR BLD: 11 % (ref 4.8–5.6)
HCV AB S/CO SERPL IA: <0.1 S/CO RATIO (ref 0–0.9)
HDLC SERPL-MCNC: 36 MG/DL (ref 40–60)
LDLC SERPL CALC-MCNC: 131 MG/DL (ref 0–100)
POTASSIUM SERPL-SCNC: 4.7 MMOL/L (ref 3.5–5.2)
PROT SERPL-MCNC: 7.6 G/DL (ref 6–8.5)
SODIUM SERPL-SCNC: 134 MMOL/L (ref 136–145)
TRIGL SERPL-MCNC: 294 MG/DL (ref 0–150)
VLDLC SERPL CALC-MCNC: 58.8 MG/DL

## 2020-02-24 DIAGNOSIS — E11.40 TYPE 2 DIABETES MELLITUS WITH DIABETIC NEUROPATHY, WITHOUT LONG-TERM CURRENT USE OF INSULIN (HCC): Primary | ICD-10-CM

## 2020-03-09 ENCOUNTER — TELEPHONE (OUTPATIENT)
Dept: FAMILY MEDICINE CLINIC | Facility: CLINIC | Age: 60
End: 2020-03-09

## 2020-03-09 DIAGNOSIS — E11.40 TYPE 2 DIABETES MELLITUS WITH DIABETIC NEUROPATHY, WITHOUT LONG-TERM CURRENT USE OF INSULIN (HCC): ICD-10-CM

## 2020-03-09 RX ORDER — LANCETS 30 GAUGE
EACH MISCELLANEOUS
Qty: 200 EACH | Refills: 10 | Status: SHIPPED | OUTPATIENT
Start: 2020-03-09

## 2020-03-09 RX ORDER — BLOOD-GLUCOSE METER
KIT MISCELLANEOUS
Qty: 1 EACH | Refills: 0 | Status: SHIPPED | OUTPATIENT
Start: 2020-03-09

## 2020-03-09 NOTE — TELEPHONE ENCOUNTER
Patient states Dr Calderon diagnosed him with diabetes.  He forgot to ask for a prescription for a meter, the lancets, and strips.  He said the pharmacy told him that how many times a day he needs to test needs to be included.  Pharmacy is [facility] at [facility].  His phone number is [phone].

## 2020-03-09 NOTE — TELEPHONE ENCOUNTER
Call in meter, lancets and test strips.  Insurance choice.  Check blood sugar BID. Dx uncontrolled DM2/fluctuating glucose. Disp 200 of lancets and test strips.  3 refills.

## 2020-03-21 ENCOUNTER — APPOINTMENT (OUTPATIENT)
Dept: DIABETES SERVICES | Facility: HOSPITAL | Age: 60
End: 2020-03-21

## 2020-06-13 DIAGNOSIS — E11.40 TYPE 2 DIABETES MELLITUS WITH DIABETIC NEUROPATHY, WITHOUT LONG-TERM CURRENT USE OF INSULIN (HCC): ICD-10-CM

## 2020-06-15 ENCOUNTER — OFFICE VISIT (OUTPATIENT)
Dept: FAMILY MEDICINE CLINIC | Facility: CLINIC | Age: 60
End: 2020-06-15

## 2020-06-15 VITALS
OXYGEN SATURATION: 98 % | SYSTOLIC BLOOD PRESSURE: 124 MMHG | HEART RATE: 71 BPM | BODY MASS INDEX: 42.66 KG/M2 | TEMPERATURE: 97.2 F | WEIGHT: 315 LBS | DIASTOLIC BLOOD PRESSURE: 86 MMHG | HEIGHT: 72 IN

## 2020-06-15 DIAGNOSIS — N52.9 ERECTILE DYSFUNCTION, UNSPECIFIED ERECTILE DYSFUNCTION TYPE: ICD-10-CM

## 2020-06-15 DIAGNOSIS — I10 ESSENTIAL HYPERTENSION: ICD-10-CM

## 2020-06-15 DIAGNOSIS — E11.40 TYPE 2 DIABETES MELLITUS WITH DIABETIC NEUROPATHY, WITHOUT LONG-TERM CURRENT USE OF INSULIN (HCC): Primary | ICD-10-CM

## 2020-06-15 DIAGNOSIS — M19.90 ARTHRITIS: ICD-10-CM

## 2020-06-15 DIAGNOSIS — E29.1 HYPOGONADISM MALE: ICD-10-CM

## 2020-06-15 DIAGNOSIS — E11.42 DIABETIC POLYNEUROPATHY ASSOCIATED WITH TYPE 2 DIABETES MELLITUS (HCC): ICD-10-CM

## 2020-06-15 PROBLEM — H65.112 ACUTE MUCOID OTITIS MEDIA OF LEFT EAR: Status: RESOLVED | Noted: 2020-02-20 | Resolved: 2020-06-15

## 2020-06-15 PROCEDURE — 99214 OFFICE O/P EST MOD 30 MIN: CPT | Performed by: INTERNAL MEDICINE

## 2020-06-15 RX ORDER — VARDENAFIL HYDROCHLORIDE 20 MG/1
20 TABLET ORAL DAILY PRN
Qty: 10 TABLET | Refills: 3 | Status: SHIPPED | OUTPATIENT
Start: 2020-06-15

## 2020-06-15 RX ORDER — METOPROLOL SUCCINATE 100 MG/1
100 TABLET, EXTENDED RELEASE ORAL DAILY
Qty: 90 TABLET | Refills: 1 | Status: SHIPPED | OUTPATIENT
Start: 2020-06-15 | End: 2020-12-17

## 2020-06-15 RX ORDER — SEMAGLUTIDE 1.34 MG/ML
INJECTION, SOLUTION SUBCUTANEOUS
Qty: 1.5 ML | Refills: 3 | Status: SHIPPED | OUTPATIENT
Start: 2020-06-15 | End: 2020-08-17

## 2020-06-15 RX ORDER — LISINOPRIL AND HYDROCHLOROTHIAZIDE 12.5; 1 MG/1; MG/1
1 TABLET ORAL DAILY
Qty: 90 TABLET | Refills: 1 | Status: SHIPPED | OUTPATIENT
Start: 2020-06-15 | End: 2020-08-08 | Stop reason: HOSPADM

## 2020-06-15 RX ORDER — DICLOFENAC SODIUM 75 MG/1
75 TABLET, DELAYED RELEASE ORAL 2 TIMES DAILY
Qty: 180 TABLET | Refills: 1 | Status: SHIPPED | OUTPATIENT
Start: 2020-06-15 | End: 2021-01-25

## 2020-06-15 NOTE — PROGRESS NOTES
Subjective   Eduardo Ramires is a 59 y.o. male.     Chief Complaint   Patient presents with   • Diabetes       History of Present Illness   Here for follow-up of diabetes.  Patient states to have been compliant with medications.  Blood sugar monitoring - patient states has been 122-247.  No episodes of hypoglycemia, nausea, vomiting, new rashes, syncope or other issues.  The dry mouth and urinary frequency is better.  Denies any difficulties with the current medication regimen of metformin 500 mg and ozempic.  Follow-up for hypertension.  Currently has been feeling well and asymptomatic without any headaches,vision changes, cough, chest pain, shortness of breath, swelling, focal neurologic deficit, memory loss or syncope.  Has been taking the medications regularly and adherent with the regimen of lisinopril-hct 10-12.5 and metoprolol succinate 100mg.  Denies medication side effects and no significant interval events.   Follow-up for hypertension.  Currently has been feeling well and asymptomatic without any headaches,vision changes, cough, chest pain, shortness of breath, swelling, focal neurologic deficit, memory loss or syncope.  Has been taking the medications regularly and adherent with the regimen lisinopril-hct 10/12.5, metoprolol succinate 100 mg Denies medication side effects and no significant interval events.   Mood has been doing well with no issues.  Continues the duloxetine 60 mg daily and lyrica for the neuropathy that feels some improved.    Follow-up for hypogonadism.  On Testosterone injections currently. States feels better on the testosterone 200 mg every 2 weeks.  No problems physically or with the mood while on it.    Complains of chronic left ear fullness and difficulty hearing from the ear.  Has chronic congestion with some blood when blows the nose.  Has follow up with ENT specialist to be done.    The following portions of the patient's history were reviewed and updated as appropriate:  allergies, current medications, past family history, past medical history, past social history, past surgical history and problem list.    Depression Screen:  No flowsheet data found.    Past Medical History:   Diagnosis Date   • Allergic rhinitis 8/26/2019   • Anal fistula 8/26/2019   • Arthritis 8/26/2019   • BMI 45.0-49.9, adult (CMS/HCC)    • BPH (benign prostatic hyperplasia) 8/26/2019   • Degeneration, intervertebral disc, thoracic 8/26/2019   • Degenerative joint disease 8/26/2019   • Degenerative joint disease of left hip    • Diabetic polyneuropathy (CMS/[facility]) 8/26/2019   • Elevated blood sugar    • Erectile dysfunction 8/26/2019   • High blood pressure    • History of kidney stones    • Hypertension 8/26/2019   • Hypogonadism male 8/26/2019   • Left hip pain    • Nephrolithiasis    • Peripheral neuropathy 8/26/2019   • Preoperative clearance    • Refused influenza vaccine    • Right knee pain 8/26/2019   • Synovial cyst popliteal space    • Thoracic back pain 8/26/2019   • Type 2 diabetes mellitus (CMS/HCC) 8/26/2019       Past Surgical History:   Procedure Laterality Date   • BACK SURGERY      Lower Back Surgery   • COLONOSCOPY  2010    normal   • KIDNEY STONE SURGERY  2016    laser stone surgery   • LUMBAR SPINE SURGERY  1989   • SHOULDER ROTATOR CUFF REPAIR Left 2012   • TOTAL HIP ARTHROPLASTY Left 12/10/2018    Dr Barraza       Family History   Problem Relation Age of Onset   • Valvular heart disease Mother    • Arthritis Father    • Hypertension Father    • Diabetes type II Father    • No Known Problems Other        Social History     Socioeconomic History   • Marital status:      Spouse name: Not on file   • Number of children: Not on file   • Years of education: Not on file   • Highest education level: Not on file   Tobacco Use   • Smoking status: Former Smoker   • Smokeless tobacco: Never Used   • Tobacco comment: smoked less than 1 pack per day for 15 years   Substance and Sexual Activity    • Alcohol use: Yes     Comment: occasionally-7 or less drinks per week   • Drug use: Yes     Types: Marijuana       Current Outpatient Medications   Medication Sig Dispense Refill   • diclofenac (VOLTAREN) 75 MG EC tablet Take 1 tablet by mouth 2 (Two) Times a Day. 180 tablet 1   • DULoxetine (CYMBALTA) 60 MG capsule TK 1 C PO BID  5   • fluticasone (FLONASE) 50 MCG/ACT nasal spray 1 spray into the nostril(s) as directed by provider.     • glucose blood test strip Use as instructed and test blood sugar two times daily 200 each 12   • glucose monitor monitoring kit Use to check blood sugar two times daily 1 each 0   • ipratropium (ATROVENT) 0.06 % nasal spray 2 sprays into the nostril(s) as directed by provider 4 (Four) Times a Day. 1 each 12   • Lancets misc Check blood sugar two times daily 200 each 10   • lisinopril-hydrochlorothiazide (PRINZIDE,ZESTORETIC) 10-12.5 MG per tablet Take 1 tablet by mouth Daily. 90 tablet 1   • metFORMIN ER (GLUCOPHAGE-XR) 500 MG 24 hr tablet Take 1 tablet by mouth Daily With Breakfast. 90 tablet 1   • metoprolol succinate XL (TOPROL-XL) 100 MG 24 hr tablet Take 1 tablet by mouth Daily. 90 tablet 1   • OZEMPIC, 0.25 OR 0.5 MG/DOSE, 2 MG/1.5ML solution pen-injector INJECT 0.25 MG WEEKLY FOR 4 WEEKS THEN INCREASE TO 0.5 MG WEEKLY THEREAFTER 1.5 mL 3   • pregabalin (LYRICA) 100 MG capsule TAKE ONE CAPSULE BY MOUTH THREE TIMES DAILY 90 capsule 2   • Testosterone Cypionate (DEPO-TESTOSTERONE) 200 MG/ML injection Inject 1 mL into the appropriate muscle as directed by prescriber Every 14 (Fourteen) Days. 2 mL 4   • cefdinir (OMNICEF) 300 MG capsule Take 1 capsule by mouth 2 (Two) Times a Day. 14 capsule 0   • vardenafil (Levitra) 20 MG tablet Take 1 tablet by mouth Daily As Needed for Erectile Dysfunction. 10 tablet 3     No current facility-administered medications for this visit.        Review of Systems   Constitutional: Negative for activity change, appetite change, fatigue, fever,  "unexpected weight gain and unexpected weight loss.   HENT: Positive for congestion, hearing loss and sinus pressure. Negative for nosebleeds, rhinorrhea, trouble swallowing and voice change.    Eyes: Negative for visual disturbance.   Respiratory: Negative for cough, chest tightness, shortness of breath and wheezing.    Cardiovascular: Negative for chest pain, palpitations and leg swelling.   Gastrointestinal: Negative for abdominal pain, blood in stool, constipation, diarrhea, nausea, vomiting, GERD and indigestion.   Genitourinary: Positive for frequency. Negative for dysuria and hematuria.   Musculoskeletal: Negative for arthralgias, back pain and myalgias.   Skin: Negative for rash and bruise.   Neurological: Negative for dizziness, tremors, weakness, light-headedness, numbness, headache and memory problem.        Neuropathy in toes and feet   Hematological: Negative for adenopathy. Does not bruise/bleed easily.   Psychiatric/Behavioral: Negative for sleep disturbance and depressed mood. The patient is not nervous/anxious.        Objective   /86 (BP Location: Left arm, Patient Position: Sitting, Cuff Size: Large Adult)   Pulse 71   Temp 97.2 °F (36.2 °C) (Temporal)   Ht 182.9 cm (72.01\")   Wt (!) 148 kg (326 lb 9.6 oz)   SpO2 98%   BMI 44.29 kg/m²     Physical Exam   Constitutional: He is oriented to person, place, and time. He appears well-developed and well-nourished. No distress.   HENT:   Head: Normocephalic and atraumatic.   Right Ear: External ear normal.   Left Ear: External ear normal.   Nose: Nose normal.   Mouth/Throat: Oropharynx is clear and moist.   Eyes: Pupils are equal, round, and reactive to light. Conjunctivae and EOM are normal.   Neck: Normal range of motion. Neck supple. No tracheal deviation present. No thyromegaly present.   Cardiovascular: Normal rate, regular rhythm, normal heart sounds and intact distal pulses. Exam reveals no gallop and no friction rub.   No murmur " heard.  Pulmonary/Chest: Effort normal and breath sounds normal. No respiratory distress.   Abdominal: Soft. Bowel sounds are normal. He exhibits no mass. There is no tenderness. There is no guarding.   Musculoskeletal: Normal range of motion. He exhibits no edema.   Lymphadenopathy:     He has no cervical adenopathy.   Neurological: He is alert and oriented to person, place, and time. He displays normal reflexes. He exhibits normal muscle tone.   Skin: Skin is warm and dry. Capillary refill takes less than 2 seconds. No rash noted. He is not diaphoretic.   Psychiatric: He has a normal mood and affect. His behavior is normal. Judgment and thought content normal.   Nursing note and vitals reviewed.      No results found for this or any previous visit (from the past 2016 hour(s)).  Assessment/Plan   Eduardo was seen today for diabetes.    Diagnoses and all orders for this visit:    Type 2 diabetes mellitus with diabetic neuropathy, without long-term current use of insulin (CMS/[facility])  -     Hemoglobin A1c  -     Comprehensive Metabolic Panel  -     Lipid Panel    Arthritis  -     diclofenac (VOLTAREN) 75 MG EC tablet; Take 1 tablet by mouth 2 (Two) Times a Day.    Essential hypertension  -     lisinopril-hydrochlorothiazide (PRINZIDE,ZESTORETIC) 10-12.5 MG per tablet; Take 1 tablet by mouth Daily.  -     metoprolol succinate XL (TOPROL-XL) 100 MG 24 hr tablet; Take 1 tablet by mouth Daily.    Hypogonadism male  -     Comprehensive Metabolic Panel  -     Lipid Panel    Diabetic polyneuropathy associated with type 2 diabetes mellitus (CMS/[facility])    Erectile dysfunction, unspecified erectile dysfunction type  -     vardenafil (Levitra) 20 MG tablet; Take 1 tablet by mouth Daily As Needed for Erectile Dysfunction.    Improved but still uncontrolled diabetes mellitus.  We will continue his current medication and check the labs as above which I predict his A1c will be much improved based on his home blood sugar readings.  He  will be following up for the dietitian education for his diabetes which she has not been able to have yet.  This should also improve his blood sugars.  His blood pressure appears to be well controlled continue current medications.  Neuropathy is improving likely secondary to improve blood sugars.  He does have the continued arthritis and erectile dysfunction issues.  We will continue the diclofenac and patient is given prescription for Levitra half to 1 tab as needed as needed.  Patient is instructed on concerns and risk factors for which she is understanding.

## 2020-06-16 LAB
ALBUMIN SERPL-MCNC: 4.3 G/DL (ref 3.5–5.2)
ALBUMIN/GLOB SERPL: 1.5 G/DL
ALP SERPL-CCNC: 67 U/L (ref 39–117)
ALT SERPL-CCNC: 19 U/L (ref 1–41)
AST SERPL-CCNC: 12 U/L (ref 1–40)
BILIRUB SERPL-MCNC: 0.4 MG/DL (ref 0.2–1.2)
BUN SERPL-MCNC: 17 MG/DL (ref 6–20)
BUN/CREAT SERPL: 15.2 (ref 7–25)
CALCIUM SERPL-MCNC: 9.4 MG/DL (ref 8.6–10.5)
CHLORIDE SERPL-SCNC: 101 MMOL/L (ref 98–107)
CHOLEST SERPL-MCNC: 206 MG/DL (ref 0–200)
CO2 SERPL-SCNC: 27 MMOL/L (ref 22–29)
CREAT SERPL-MCNC: 1.12 MG/DL (ref 0.76–1.27)
GLOBULIN SER CALC-MCNC: 2.9 GM/DL
GLUCOSE SERPL-MCNC: 162 MG/DL (ref 65–99)
HBA1C MFR BLD: 7 % (ref 4.8–5.6)
HDLC SERPL-MCNC: 33 MG/DL (ref 40–60)
LDLC SERPL CALC-MCNC: 129 MG/DL (ref 0–100)
POTASSIUM SERPL-SCNC: 4.3 MMOL/L (ref 3.5–5.2)
PROT SERPL-MCNC: 7.2 G/DL (ref 6–8.5)
SODIUM SERPL-SCNC: 138 MMOL/L (ref 136–145)
TRIGL SERPL-MCNC: 221 MG/DL (ref 0–150)
VLDLC SERPL CALC-MCNC: 44.2 MG/DL

## 2020-06-17 RX ORDER — HYDROCHLOROTHIAZIDE 12.5 MG/1
12.5 TABLET ORAL DAILY
Qty: 90 TABLET | Refills: 0 | Status: SHIPPED | OUTPATIENT
Start: 2020-06-17 | End: 2020-08-08 | Stop reason: HOSPADM

## 2020-06-17 RX ORDER — LISINOPRIL 10 MG/1
10 TABLET ORAL DAILY
Qty: 90 TABLET | Refills: 0 | Status: SHIPPED | OUTPATIENT
Start: 2020-06-17 | End: 2020-08-08 | Stop reason: HOSPADM

## 2020-07-18 ENCOUNTER — HOSPITAL ENCOUNTER (OUTPATIENT)
Dept: DIABETES SERVICES | Facility: HOSPITAL | Age: 60
Discharge: HOME | End: 2020-07-18
Admitting: INTERNAL MEDICINE

## 2020-07-18 PROCEDURE — G0109 DIAB MANAGE TRN IND/GROUP: HCPCS

## 2020-08-03 DIAGNOSIS — E11.40 TYPE 2 DIABETES MELLITUS WITH DIABETIC NEUROPATHY, WITHOUT LONG-TERM CURRENT USE OF INSULIN (HCC): ICD-10-CM

## 2020-08-03 RX ORDER — METFORMIN HYDROCHLORIDE 500 MG/1
500 TABLET, EXTENDED RELEASE ORAL
Qty: 90 TABLET | Refills: 1 | Status: SHIPPED | OUTPATIENT
Start: 2020-08-03 | End: 2020-12-17

## 2020-08-06 ENCOUNTER — APPOINTMENT (OUTPATIENT)
Dept: GENERAL RADIOLOGY | Facility: HOSPITAL | Age: 60
End: 2020-08-06

## 2020-08-06 ENCOUNTER — HOSPITAL ENCOUNTER (OUTPATIENT)
Facility: HOSPITAL | Age: 60
Discharge: HOME | End: 2020-08-08
Attending: EMERGENCY MEDICINE | Admitting: INTERNAL MEDICINE

## 2020-08-06 DIAGNOSIS — I21.4 NSTEMI, INITIAL EPISODE OF CARE (HCC): ICD-10-CM

## 2020-08-06 DIAGNOSIS — R07.9 CHEST PAIN, UNSPECIFIED TYPE: Primary | ICD-10-CM

## 2020-08-06 LAB
ALBUMIN SERPL-MCNC: 3.6 G/DL (ref 3.5–5.2)
ALBUMIN/GLOB SERPL: 1.2 G/DL
ALP SERPL-CCNC: 52 U/L (ref 39–117)
ALT SERPL W P-5'-P-CCNC: 20 U/L (ref 1–41)
ANION GAP SERPL CALCULATED.3IONS-SCNC: 7.6 MMOL/L (ref 5–15)
AST SERPL-CCNC: 15 U/L (ref 1–40)
BASOPHILS # BLD AUTO: 0.04 10*3/MM3 (ref 0–0.2)
BASOPHILS NFR BLD AUTO: 0.5 % (ref 0–1.5)
BILIRUB SERPL-MCNC: 0.4 MG/DL (ref 0–1.2)
BUN SERPL-MCNC: 14 MG/DL (ref 8–23)
BUN/CREAT SERPL: 14.7 (ref 7–25)
CALCIUM SPEC-SCNC: 9 MG/DL (ref 8.6–10.5)
CHLORIDE SERPL-SCNC: 100 MMOL/L (ref 98–107)
CO2 SERPL-SCNC: 25.4 MMOL/L (ref 22–29)
CREAT SERPL-MCNC: 0.95 MG/DL (ref 0.76–1.27)
DEPRECATED RDW RBC AUTO: 49.3 FL (ref 37–54)
EOSINOPHIL # BLD AUTO: 0.27 10*3/MM3 (ref 0–0.4)
EOSINOPHIL NFR BLD AUTO: 3.2 % (ref 0.3–6.2)
ERYTHROCYTE [DISTWIDTH] IN BLOOD BY AUTOMATED COUNT: 16.4 % (ref 12.3–15.4)
GFR SERPL CREATININE-BSD FRML MDRD: 81 ML/MIN/1.73
GLOBULIN UR ELPH-MCNC: 3.1 GM/DL
GLUCOSE SERPL-MCNC: 202 MG/DL (ref 65–99)
HCT VFR BLD AUTO: 53.2 % (ref 37.5–51)
HGB BLD-MCNC: 17.2 G/DL (ref 13–17.7)
IMM GRANULOCYTES # BLD AUTO: 0.03 10*3/MM3 (ref 0–0.05)
IMM GRANULOCYTES NFR BLD AUTO: 0.4 % (ref 0–0.5)
LYMPHOCYTES # BLD AUTO: 1.31 10*3/MM3 (ref 0.7–3.1)
LYMPHOCYTES NFR BLD AUTO: 15.4 % (ref 19.6–45.3)
MCH RBC QN AUTO: 28.2 PG (ref 26.6–33)
MCHC RBC AUTO-ENTMCNC: 32.3 G/DL (ref 31.5–35.7)
MCV RBC AUTO: 87.4 FL (ref 79–97)
MONOCYTES # BLD AUTO: 0.62 10*3/MM3 (ref 0.1–0.9)
MONOCYTES NFR BLD AUTO: 7.3 % (ref 5–12)
NEUTROPHILS NFR BLD AUTO: 6.26 10*3/MM3 (ref 1.7–7)
NEUTROPHILS NFR BLD AUTO: 73.2 % (ref 42.7–76)
NRBC BLD AUTO-RTO: 0 /100 WBC (ref 0–0.2)
NT-PROBNP SERPL-MCNC: 297.5 PG/ML (ref 0–900)
PLATELET # BLD AUTO: 257 10*3/MM3 (ref 140–450)
PMV BLD AUTO: 10.1 FL (ref 6–12)
POTASSIUM SERPL-SCNC: 4.3 MMOL/L (ref 3.5–5.2)
PROT SERPL-MCNC: 6.7 G/DL (ref 6–8.5)
RBC # BLD AUTO: 6.09 10*6/MM3 (ref 4.14–5.8)
SODIUM SERPL-SCNC: 133 MMOL/L (ref 136–145)
TROPONIN T SERPL-MCNC: <0.01 NG/ML (ref 0–0.03)
WBC # BLD AUTO: 8.53 10*3/MM3 (ref 3.4–10.8)

## 2020-08-06 PROCEDURE — 93005 ELECTROCARDIOGRAM TRACING: CPT | Performed by: EMERGENCY MEDICINE

## 2020-08-06 PROCEDURE — 99284 EMERGENCY DEPT VISIT MOD MDM: CPT

## 2020-08-06 PROCEDURE — G0378 HOSPITAL OBSERVATION PER HR: HCPCS

## 2020-08-06 PROCEDURE — 85025 COMPLETE CBC W/AUTO DIFF WBC: CPT | Performed by: EMERGENCY MEDICINE

## 2020-08-06 PROCEDURE — 71045 X-RAY EXAM CHEST 1 VIEW: CPT

## 2020-08-06 PROCEDURE — 93010 ELECTROCARDIOGRAM REPORT: CPT | Performed by: INTERNAL MEDICINE

## 2020-08-06 PROCEDURE — 83880 ASSAY OF NATRIURETIC PEPTIDE: CPT | Performed by: EMERGENCY MEDICINE

## 2020-08-06 PROCEDURE — 80053 COMPREHEN METABOLIC PANEL: CPT | Performed by: EMERGENCY MEDICINE

## 2020-08-06 PROCEDURE — 84484 ASSAY OF TROPONIN QUANT: CPT | Performed by: EMERGENCY MEDICINE

## 2020-08-06 RX ORDER — FLUTICASONE PROPIONATE 50 MCG
1 SPRAY, SUSPENSION (ML) NASAL DAILY
Status: DISCONTINUED | OUTPATIENT
Start: 2020-08-07 | End: 2020-08-08 | Stop reason: HOSPADM

## 2020-08-06 RX ORDER — ONDANSETRON 2 MG/ML
4 INJECTION INTRAMUSCULAR; INTRAVENOUS EVERY 6 HOURS PRN
Status: DISCONTINUED | OUTPATIENT
Start: 2020-08-06 | End: 2020-08-08 | Stop reason: HOSPADM

## 2020-08-06 RX ORDER — SODIUM CHLORIDE 0.9 % (FLUSH) 0.9 %
10 SYRINGE (ML) INJECTION AS NEEDED
Status: DISCONTINUED | OUTPATIENT
Start: 2020-08-06 | End: 2020-08-08 | Stop reason: HOSPADM

## 2020-08-06 RX ORDER — PREGABALIN 100 MG/1
100 CAPSULE ORAL 3 TIMES DAILY
Status: DISCONTINUED | OUTPATIENT
Start: 2020-08-07 | End: 2020-08-08 | Stop reason: HOSPADM

## 2020-08-06 RX ORDER — NICOTINE POLACRILEX 4 MG
15 LOZENGE BUCCAL
Status: DISCONTINUED | OUTPATIENT
Start: 2020-08-06 | End: 2020-08-08 | Stop reason: HOSPADM

## 2020-08-06 RX ORDER — IPRATROPIUM BROMIDE 42 UG/1
2 SPRAY, METERED NASAL 4 TIMES DAILY
Status: DISCONTINUED | OUTPATIENT
Start: 2020-08-07 | End: 2020-08-08 | Stop reason: HOSPADM

## 2020-08-06 RX ORDER — DEXTROSE MONOHYDRATE 25 G/50ML
25 INJECTION, SOLUTION INTRAVENOUS
Status: DISCONTINUED | OUTPATIENT
Start: 2020-08-06 | End: 2020-08-08 | Stop reason: HOSPADM

## 2020-08-06 RX ORDER — DULOXETIN HYDROCHLORIDE 60 MG/1
60 CAPSULE, DELAYED RELEASE ORAL DAILY
Status: DISCONTINUED | OUTPATIENT
Start: 2020-08-07 | End: 2020-08-08 | Stop reason: HOSPADM

## 2020-08-06 RX ORDER — ASPIRIN 325 MG
325 TABLET ORAL ONCE
Status: DISCONTINUED | OUTPATIENT
Start: 2020-08-06 | End: 2020-08-07

## 2020-08-06 RX ORDER — NITROGLYCERIN 0.4 MG/1
0.4 TABLET SUBLINGUAL
Status: DISCONTINUED | OUTPATIENT
Start: 2020-08-06 | End: 2020-08-08 | Stop reason: HOSPADM

## 2020-08-06 RX ORDER — SODIUM CHLORIDE 9 MG/ML
100 INJECTION, SOLUTION INTRAVENOUS CONTINUOUS
Status: DISCONTINUED | OUTPATIENT
Start: 2020-08-07 | End: 2020-08-08 | Stop reason: HOSPADM

## 2020-08-06 RX ORDER — SODIUM CHLORIDE 0.9 % (FLUSH) 0.9 %
10 SYRINGE (ML) INJECTION EVERY 12 HOURS SCHEDULED
Status: DISCONTINUED | OUTPATIENT
Start: 2020-08-07 | End: 2020-08-08 | Stop reason: HOSPADM

## 2020-08-06 RX ORDER — METOPROLOL SUCCINATE 100 MG/1
100 TABLET, EXTENDED RELEASE ORAL DAILY
Status: DISCONTINUED | OUTPATIENT
Start: 2020-08-07 | End: 2020-08-07

## 2020-08-07 PROBLEM — I21.4 NSTEMI, INITIAL EPISODE OF CARE (HCC): Status: ACTIVE | Noted: 2020-08-06

## 2020-08-07 LAB
ACT BLD: 345 SECONDS (ref 82–152)
ALBUMIN SERPL-MCNC: 3.5 G/DL (ref 3.5–5.2)
ALBUMIN/GLOB SERPL: 1.1 G/DL
ALP SERPL-CCNC: 51 U/L (ref 39–117)
ALT SERPL W P-5'-P-CCNC: 20 U/L (ref 1–41)
ANION GAP SERPL CALCULATED.3IONS-SCNC: 9.6 MMOL/L (ref 5–15)
AST SERPL-CCNC: 23 U/L (ref 1–40)
B PARAPERT DNA SPEC QL NAA+PROBE: NOT DETECTED
B PERT DNA SPEC QL NAA+PROBE: NOT DETECTED
BASOPHILS # BLD AUTO: 0.05 10*3/MM3 (ref 0–0.2)
BASOPHILS NFR BLD AUTO: 0.5 % (ref 0–1.5)
BILIRUB SERPL-MCNC: 0.4 MG/DL (ref 0–1.2)
BUN SERPL-MCNC: 12 MG/DL (ref 8–23)
BUN/CREAT SERPL: 15.2 (ref 7–25)
C PNEUM DNA NPH QL NAA+NON-PROBE: NOT DETECTED
CALCIUM SPEC-SCNC: 8.9 MG/DL (ref 8.6–10.5)
CHLORIDE SERPL-SCNC: 102 MMOL/L (ref 98–107)
CHOLEST SERPL-MCNC: 185 MG/DL (ref 0–200)
CO2 SERPL-SCNC: 23.4 MMOL/L (ref 22–29)
CREAT SERPL-MCNC: 0.79 MG/DL (ref 0.76–1.27)
DEPRECATED RDW RBC AUTO: 47.5 FL (ref 37–54)
EOSINOPHIL # BLD AUTO: 0.29 10*3/MM3 (ref 0–0.4)
EOSINOPHIL NFR BLD AUTO: 3 % (ref 0.3–6.2)
ERYTHROCYTE [DISTWIDTH] IN BLOOD BY AUTOMATED COUNT: 15.5 % (ref 12.3–15.4)
FLUAV H1 2009 PAND RNA NPH QL NAA+PROBE: NOT DETECTED
FLUAV H1 HA GENE NPH QL NAA+PROBE: NOT DETECTED
FLUAV H3 RNA NPH QL NAA+PROBE: NOT DETECTED
FLUAV SUBTYP SPEC NAA+PROBE: NOT DETECTED
FLUBV RNA ISLT QL NAA+PROBE: NOT DETECTED
GFR SERPL CREATININE-BSD FRML MDRD: 100 ML/MIN/1.73
GLOBULIN UR ELPH-MCNC: 3.2 GM/DL
GLUCOSE BLDC GLUCOMTR-MCNC: 110 MG/DL (ref 70–130)
GLUCOSE BLDC GLUCOMTR-MCNC: 118 MG/DL (ref 70–130)
GLUCOSE BLDC GLUCOMTR-MCNC: 121 MG/DL (ref 70–130)
GLUCOSE BLDC GLUCOMTR-MCNC: 149 MG/DL (ref 70–130)
GLUCOSE BLDC GLUCOMTR-MCNC: 197 MG/DL (ref 70–130)
GLUCOSE SERPL-MCNC: 111 MG/DL (ref 65–99)
HADV DNA SPEC NAA+PROBE: NOT DETECTED
HBA1C MFR BLD: 7 % (ref 4.8–5.6)
HCOV 229E RNA SPEC QL NAA+PROBE: NOT DETECTED
HCOV HKU1 RNA SPEC QL NAA+PROBE: NOT DETECTED
HCOV NL63 RNA SPEC QL NAA+PROBE: NOT DETECTED
HCOV OC43 RNA SPEC QL NAA+PROBE: NOT DETECTED
HCT VFR BLD AUTO: 53.3 % (ref 37.5–51)
HDLC SERPL-MCNC: 26 MG/DL (ref 40–60)
HGB BLD-MCNC: 17.5 G/DL (ref 13–17.7)
HMPV RNA NPH QL NAA+NON-PROBE: NOT DETECTED
HPIV1 RNA SPEC QL NAA+PROBE: NOT DETECTED
HPIV2 RNA SPEC QL NAA+PROBE: NOT DETECTED
HPIV3 RNA NPH QL NAA+PROBE: NOT DETECTED
HPIV4 P GENE NPH QL NAA+PROBE: NOT DETECTED
IMM GRANULOCYTES # BLD AUTO: 0.04 10*3/MM3 (ref 0–0.05)
IMM GRANULOCYTES NFR BLD AUTO: 0.4 % (ref 0–0.5)
LDLC SERPL CALC-MCNC: 109 MG/DL (ref 0–100)
LDLC/HDLC SERPL: 4.18 {RATIO}
LYMPHOCYTES # BLD AUTO: 2.33 10*3/MM3 (ref 0.7–3.1)
LYMPHOCYTES NFR BLD AUTO: 23.9 % (ref 19.6–45.3)
M PNEUMO IGG SER IA-ACNC: NOT DETECTED
MCH RBC QN AUTO: 28 PG (ref 26.6–33)
MCHC RBC AUTO-ENTMCNC: 32.8 G/DL (ref 31.5–35.7)
MCV RBC AUTO: 85.4 FL (ref 79–97)
MONOCYTES # BLD AUTO: 0.73 10*3/MM3 (ref 0.1–0.9)
MONOCYTES NFR BLD AUTO: 7.5 % (ref 5–12)
NEUTROPHILS NFR BLD AUTO: 6.29 10*3/MM3 (ref 1.7–7)
NEUTROPHILS NFR BLD AUTO: 64.7 % (ref 42.7–76)
NRBC BLD AUTO-RTO: 0 /100 WBC (ref 0–0.2)
PLATELET # BLD AUTO: 252 10*3/MM3 (ref 140–450)
PMV BLD AUTO: 9.9 FL (ref 6–12)
POTASSIUM SERPL-SCNC: 4.2 MMOL/L (ref 3.5–5.2)
PROT SERPL-MCNC: 6.7 G/DL (ref 6–8.5)
RBC # BLD AUTO: 6.24 10*6/MM3 (ref 4.14–5.8)
RHINOVIRUS RNA SPEC NAA+PROBE: NOT DETECTED
RSV RNA NPH QL NAA+NON-PROBE: NOT DETECTED
SARS-COV-2 RNA PNL SPEC NAA+PROBE: NOT DETECTED
SODIUM SERPL-SCNC: 135 MMOL/L (ref 136–145)
TRIGL SERPL-MCNC: 252 MG/DL (ref 0–150)
TROPONIN T SERPL-MCNC: 0.03 NG/ML (ref 0–0.03)
TROPONIN T SERPL-MCNC: 0.1 NG/ML (ref 0–0.03)
TROPONIN T SERPL-MCNC: 0.12 NG/ML (ref 0–0.03)
URATE SERPL-MCNC: 7.2 MG/DL (ref 3.4–7)
VLDLC SERPL-MCNC: 50.4 MG/DL (ref 5–40)
WBC # BLD AUTO: 9.73 10*3/MM3 (ref 3.4–10.8)

## 2020-08-07 PROCEDURE — 92928 PRQ TCAT PLMT NTRAC ST 1 LES: CPT | Performed by: INTERNAL MEDICINE

## 2020-08-07 PROCEDURE — G0378 HOSPITAL OBSERVATION PER HR: HCPCS

## 2020-08-07 PROCEDURE — C1769 GUIDE WIRE: HCPCS | Performed by: INTERNAL MEDICINE

## 2020-08-07 PROCEDURE — 25010000002 FENTANYL CITRATE (PF) 100 MCG/2ML SOLUTION: Performed by: INTERNAL MEDICINE

## 2020-08-07 PROCEDURE — C9600 PERC DRUG-EL COR STENT SING: HCPCS | Performed by: INTERNAL MEDICINE

## 2020-08-07 PROCEDURE — 80053 COMPREHEN METABOLIC PANEL: CPT | Performed by: INTERNAL MEDICINE

## 2020-08-07 PROCEDURE — 82962 GLUCOSE BLOOD TEST: CPT

## 2020-08-07 PROCEDURE — 80061 LIPID PANEL: CPT | Performed by: INTERNAL MEDICINE

## 2020-08-07 PROCEDURE — 63710000001 INSULIN LISPRO (HUMAN) PER 5 UNITS: Performed by: INTERNAL MEDICINE

## 2020-08-07 PROCEDURE — 0202U NFCT DS 22 TRGT SARS-COV-2: CPT | Performed by: INTERNAL MEDICINE

## 2020-08-07 PROCEDURE — 83036 HEMOGLOBIN GLYCOSYLATED A1C: CPT | Performed by: INTERNAL MEDICINE

## 2020-08-07 PROCEDURE — C1887 CATHETER, GUIDING: HCPCS | Performed by: INTERNAL MEDICINE

## 2020-08-07 PROCEDURE — 99244 OFF/OP CNSLTJ NEW/EST MOD 40: CPT | Performed by: NURSE PRACTITIONER

## 2020-08-07 PROCEDURE — 99153 MOD SED SAME PHYS/QHP EA: CPT | Performed by: INTERNAL MEDICINE

## 2020-08-07 PROCEDURE — 0 IOPAMIDOL PER 1 ML: Performed by: INTERNAL MEDICINE

## 2020-08-07 PROCEDURE — 93010 ELECTROCARDIOGRAM REPORT: CPT | Performed by: INTERNAL MEDICINE

## 2020-08-07 PROCEDURE — 93458 L HRT ARTERY/VENTRICLE ANGIO: CPT | Performed by: INTERNAL MEDICINE

## 2020-08-07 PROCEDURE — 25010000002 EPTIFIBATIDE PER 5 MG: Performed by: INTERNAL MEDICINE

## 2020-08-07 PROCEDURE — 93005 ELECTROCARDIOGRAM TRACING: CPT | Performed by: INTERNAL MEDICINE

## 2020-08-07 PROCEDURE — C1725 CATH, TRANSLUMIN NON-LASER: HCPCS | Performed by: INTERNAL MEDICINE

## 2020-08-07 PROCEDURE — 84484 ASSAY OF TROPONIN QUANT: CPT | Performed by: INTERNAL MEDICINE

## 2020-08-07 PROCEDURE — 99152 MOD SED SAME PHYS/QHP 5/>YRS: CPT | Performed by: INTERNAL MEDICINE

## 2020-08-07 PROCEDURE — 25010000002 HEPARIN (PORCINE) PER 1000 UNITS: Performed by: INTERNAL MEDICINE

## 2020-08-07 PROCEDURE — 85025 COMPLETE CBC W/AUTO DIFF WBC: CPT | Performed by: INTERNAL MEDICINE

## 2020-08-07 PROCEDURE — 25010000002 MIDAZOLAM PER 1 MG: Performed by: INTERNAL MEDICINE

## 2020-08-07 PROCEDURE — 96360 HYDRATION IV INFUSION INIT: CPT

## 2020-08-07 PROCEDURE — 84550 ASSAY OF BLOOD/URIC ACID: CPT | Performed by: INTERNAL MEDICINE

## 2020-08-07 PROCEDURE — C1874 STENT, COATED/COV W/DEL SYS: HCPCS | Performed by: INTERNAL MEDICINE

## 2020-08-07 PROCEDURE — 96361 HYDRATE IV INFUSION ADD-ON: CPT

## 2020-08-07 PROCEDURE — 85347 COAGULATION TIME ACTIVATED: CPT

## 2020-08-07 PROCEDURE — C1894 INTRO/SHEATH, NON-LASER: HCPCS | Performed by: INTERNAL MEDICINE

## 2020-08-07 DEVICE — XIENCE SIERRA™ EVEROLIMUS ELUTING CORONARY STENT SYSTEM 4.00 MM X 28 MM / RAPID-EXCHANGE
Type: IMPLANTABLE DEVICE | Site: CORONARY | Status: FUNCTIONAL
Brand: XIENCE SIERRA™

## 2020-08-07 RX ORDER — HYDROCHLOROTHIAZIDE 12.5 MG/1
12.5 CAPSULE, GELATIN COATED ORAL DAILY
Status: DISCONTINUED | OUTPATIENT
Start: 2020-08-07 | End: 2020-08-08 | Stop reason: HOSPADM

## 2020-08-07 RX ORDER — ASPIRIN 81 MG/1
81 TABLET ORAL DAILY
Status: DISCONTINUED | OUTPATIENT
Start: 2020-08-07 | End: 2020-08-08 | Stop reason: HOSPADM

## 2020-08-07 RX ORDER — ATORVASTATIN CALCIUM 20 MG/1
40 TABLET, FILM COATED ORAL NIGHTLY
Status: DISCONTINUED | OUTPATIENT
Start: 2020-08-07 | End: 2020-08-08 | Stop reason: HOSPADM

## 2020-08-07 RX ORDER — HEPARIN SODIUM 1000 [USP'U]/ML
INJECTION, SOLUTION INTRAVENOUS; SUBCUTANEOUS AS NEEDED
Status: DISCONTINUED | OUTPATIENT
Start: 2020-08-07 | End: 2020-08-07 | Stop reason: HOSPADM

## 2020-08-07 RX ORDER — LISINOPRIL 20 MG/1
20 TABLET ORAL
Status: DISCONTINUED | OUTPATIENT
Start: 2020-08-07 | End: 2020-08-08

## 2020-08-07 RX ORDER — ACETAMINOPHEN 325 MG/1
650 TABLET ORAL EVERY 4 HOURS PRN
Status: DISCONTINUED | OUTPATIENT
Start: 2020-08-07 | End: 2020-08-08 | Stop reason: HOSPADM

## 2020-08-07 RX ORDER — METOPROLOL SUCCINATE 100 MG/1
100 TABLET, EXTENDED RELEASE ORAL
Status: DISCONTINUED | OUTPATIENT
Start: 2020-08-08 | End: 2020-08-08 | Stop reason: HOSPADM

## 2020-08-07 RX ORDER — MIDAZOLAM HYDROCHLORIDE 1 MG/ML
INJECTION INTRAMUSCULAR; INTRAVENOUS AS NEEDED
Status: DISCONTINUED | OUTPATIENT
Start: 2020-08-07 | End: 2020-08-07 | Stop reason: HOSPADM

## 2020-08-07 RX ORDER — CLOPIDOGREL BISULFATE 75 MG/1
TABLET ORAL AS NEEDED
Status: DISCONTINUED | OUTPATIENT
Start: 2020-08-07 | End: 2020-08-07 | Stop reason: HOSPADM

## 2020-08-07 RX ORDER — CLOPIDOGREL BISULFATE 75 MG/1
75 TABLET ORAL DAILY
Status: DISCONTINUED | OUTPATIENT
Start: 2020-08-08 | End: 2020-08-08 | Stop reason: HOSPADM

## 2020-08-07 RX ORDER — FENTANYL CITRATE 50 UG/ML
INJECTION, SOLUTION INTRAMUSCULAR; INTRAVENOUS AS NEEDED
Status: DISCONTINUED | OUTPATIENT
Start: 2020-08-07 | End: 2020-08-07 | Stop reason: HOSPADM

## 2020-08-07 RX ORDER — EPTIFIBATIDE 0.75 MG/ML
2 INJECTION, SOLUTION INTRAVENOUS CONTINUOUS
Status: DISPENSED | OUTPATIENT
Start: 2020-08-07 | End: 2020-08-08

## 2020-08-07 RX ORDER — CLOPIDOGREL BISULFATE 75 MG/1
600 TABLET ORAL ONCE
Status: DISCONTINUED | OUTPATIENT
Start: 2020-08-07 | End: 2020-08-08 | Stop reason: HOSPADM

## 2020-08-07 RX ORDER — EPTIFIBATIDE 20 MG/10ML
180 INJECTION INTRAVENOUS ONCE
Status: DISCONTINUED | OUTPATIENT
Start: 2020-08-07 | End: 2020-08-08 | Stop reason: HOSPADM

## 2020-08-07 RX ORDER — EPTIFIBATIDE 0.75 MG/ML
INJECTION, SOLUTION INTRAVENOUS CONTINUOUS PRN
Status: COMPLETED | OUTPATIENT
Start: 2020-08-07 | End: 2020-08-07

## 2020-08-07 RX ORDER — METOPROLOL SUCCINATE 100 MG/1
100 TABLET, EXTENDED RELEASE ORAL
Status: DISCONTINUED | OUTPATIENT
Start: 2020-08-07 | End: 2020-08-07

## 2020-08-07 RX ORDER — LIDOCAINE HYDROCHLORIDE 20 MG/ML
INJECTION, SOLUTION INFILTRATION; PERINEURAL AS NEEDED
Status: DISCONTINUED | OUTPATIENT
Start: 2020-08-07 | End: 2020-08-07 | Stop reason: HOSPADM

## 2020-08-07 RX ADMIN — SODIUM CHLORIDE 100 ML/HR: 9 INJECTION, SOLUTION INTRAVENOUS at 01:02

## 2020-08-07 RX ADMIN — ASPIRIN 81 MG: 81 TABLET, COATED ORAL at 11:57

## 2020-08-07 RX ADMIN — SODIUM CHLORIDE, PRESERVATIVE FREE 10 ML: 5 INJECTION INTRAVENOUS at 21:39

## 2020-08-07 RX ADMIN — SODIUM CHLORIDE 100 ML/HR: 9 INJECTION, SOLUTION INTRAVENOUS at 09:52

## 2020-08-07 RX ADMIN — IPRATROPIUM BROMIDE 2 SPRAY: 42 SPRAY, METERED NASAL at 18:40

## 2020-08-07 RX ADMIN — SODIUM CHLORIDE, PRESERVATIVE FREE 10 ML: 5 INJECTION INTRAVENOUS at 10:36

## 2020-08-07 RX ADMIN — SODIUM CHLORIDE, PRESERVATIVE FREE 10 ML: 5 INJECTION INTRAVENOUS at 04:22

## 2020-08-07 RX ADMIN — PREGABALIN 100 MG: 100 CAPSULE ORAL at 17:37

## 2020-08-07 RX ADMIN — FLUTICASONE PROPIONATE 1 SPRAY: 50 SPRAY, METERED NASAL at 10:33

## 2020-08-07 RX ADMIN — DULOXETINE HYDROCHLORIDE 60 MG: 60 CAPSULE, DELAYED RELEASE ORAL at 18:15

## 2020-08-07 RX ADMIN — PREGABALIN 100 MG: 100 CAPSULE ORAL at 21:39

## 2020-08-07 RX ADMIN — INSULIN LISPRO 2 UNITS: 100 INJECTION, SOLUTION INTRAVENOUS; SUBCUTANEOUS at 18:15

## 2020-08-07 RX ADMIN — EPTIFIBATIDE 2 MCG/KG/MIN: 0.75 INJECTION INTRAVENOUS at 19:27

## 2020-08-07 RX ADMIN — IPRATROPIUM BROMIDE 2 SPRAY: 42 SPRAY, METERED NASAL at 21:39

## 2020-08-07 RX ADMIN — ATORVASTATIN CALCIUM 40 MG: 20 TABLET, FILM COATED ORAL at 21:39

## 2020-08-07 RX ADMIN — METOPROLOL SUCCINATE 100 MG: 100 TABLET, EXTENDED RELEASE ORAL at 09:33

## 2020-08-07 RX ADMIN — NITROGLYCERIN 1 INCH: 20 OINTMENT TOPICAL at 10:33

## 2020-08-07 NOTE — PROGRESS NOTES
Pt had a negative COVID swab from [facility] of [facility] lab on 8-5-2020 ([phone]). Kathy Herbert RN

## 2020-08-07 NOTE — PLAN OF CARE
Problem: Patient Care Overview  Goal: Plan of Care Review  Outcome: Ongoing (interventions implemented as appropriate)  Flowsheets (Taken 8/7/2020 0318)  Progress: no change  Plan of Care Reviewed With: patient  Outcome Summary: Patient admitted from ER d/t complaints of CP prior to admission.  No complaints of pain during this shift.  Patient is A&O x 4 and up ad tamra.  NPO since midnight pending Cardiology consult and possible diagnostic study.  Cardio Consult called.  Vitals stable.  NACL at 100. Will continue to monitor.

## 2020-08-07 NOTE — CONSULTS
Patient Name: Eduardo Ramires  :1960  60 y.o.    Date of Admission: 2020  Date of Consultation:  20  Encounter Provider: KATHRIN Acevedo  Place of Service: [facility] CARDIOLOGY  Referring Provider: No ref. provider found  Patient Care Team:  Stan Calderon MD as PCP - General (Internal Medicine)  Amber Brink DO (Neurology)  Ervin Chandra MD as Consulting Physician (Orthopedic Surgery)  Yariel Wooten MD as Consulting Physician (Medical Oncology)  Sudhir Barraza MD as Consulting Physician (Orthopedic Surgery)      Chief complaint: Chest pain, NSTEMI    History of Present Illness:    This is a 60 year-old male patient with a history of hypertension, diabetes, diabetic polyneuropathy, joint disease, morbid obesity, former smoker, family history of heart disease with mother with CABG and valve replacement.    He presented 2020 with intermittent left-sided sharp chest pain radiating to shoulder and arm for a few days but became acutely worse.  Pain was associated with shortness of breath and diaphoresis.  Symptoms were improved with rest.  Chest x-ray negative.  Initial troponin negative now has climbed to 0.100.  proBNP normal, CMP within normal limit except for mildly low sodium 135, glucose elevated 111, CBC with elevated red blood cell count 6.24, elevated hematocrit 53.3, hemoglobin A1c 7.00%, lipid panel , HDL 26, triglycerides 252, total cholesterol 185.Initial EKG with borderline T wave abnormalities, sinus rhythm and LVH.  Do not have a prior comparison but report from [place] from [place] reads sinus rhythm and LVH.    We have been consulted for evaluation of chest pain.  EKG today shows nonspecific ST-T abnormalities without obvious ischemia.  He tells me he had intermittent mild chest discomfort for about 2 or 3 days but yesterday started becoming more severe with radiation to the left arm with  "some numbness and tingling as well as diaphoresis and shortness of breath.  Since he has been hospitalized he has had improvement.  He had some mild chest pressure rated 2/10 without shortness of breath or radiation while I was in the room lasting just a couple minutes.  He was not given nitroglycerin by EMS as he has been taking Levitra but believes last dose was a few weeks ago.  Does not check his blood pressure at home.  He was newly diagnosed with diabetes March of this year has been checking his blood sugars and report they are \"up and down\".  His mother had a CABG x2 in 2 valve replacements in her 70s.  No personal history of coronary disease, MI, stroke, cancer.  He is not currently a smoker but is a former smoker.  He was found to have high cholesterol and has not been on medical therapy.  He states the nurses think he has sleep apnea.  He has never been tested.  He works at a sedentary job and does not do much physical activity.  No reports of dizziness, lightheadedness, syncope, near syncope, headaches or vision changes.  Denies iodine or dye allergy.    Past Medical History:   Diagnosis Date   • Allergic rhinitis 8/26/2019   • Anal fistula 8/26/2019   • Arthritis 8/26/2019   • BMI 45.0-49.9, adult (CMS/[facility])    • BPH (benign prostatic hyperplasia) 8/26/2019   • Degeneration, intervertebral disc, thoracic 8/26/2019   • Degenerative joint disease 8/26/2019   • Degenerative joint disease of left hip    • Diabetic polyneuropathy (CMS/[facility]) 8/26/2019   • Elevated blood sugar    • Erectile dysfunction 8/26/2019   • High blood pressure    • History of kidney stones    • Hypertension 8/26/2019   • Hypogonadism male 8/26/2019   • Left hip pain    • Nephrolithiasis    • Peripheral neuropathy 8/26/2019   • Preoperative clearance    • Refused influenza vaccine    • Right knee pain 8/26/2019   • Synovial cyst popliteal space    • Thoracic back pain 8/26/2019   • Type 2 diabetes mellitus (CMS/[facility]) 8/26/2019       Past " Surgical History:   Procedure Laterality Date   • BACK SURGERY      Lower Back Surgery   • COLONOSCOPY  2010    normal   • KIDNEY STONE SURGERY  2016    laser stone surgery   • LUMBAR SPINE SURGERY  1989   • SHOULDER ROTATOR CUFF REPAIR Left 2012   • TOTAL HIP ARTHROPLASTY Left 12/10/2018    Dr Barraza         Prior to Admission medications    Medication Sig Start Date End Date Taking? Authorizing Provider   cefdinir (OMNICEF) 300 MG capsule Take 1 capsule by mouth 2 (Two) Times a Day. 2/20/20   Stan Calderon MD   diclofenac (VOLTAREN) 75 MG EC tablet Take 1 tablet by mouth 2 (Two) Times a Day. 6/15/20   Stan Calderon MD   DULoxetine (CYMBALTA) 60 MG capsule TK 1 C PO BID 8/5/19   ProviderJackie MD   fluticasone (FLONASE) 50 MCG/ACT nasal spray 1 spray into the nostril(s) as directed by provider.    Provider, MD Jackie   glucose blood test strip Use as instructed and test blood sugar two times daily 3/9/20   Stan Calderon MD   glucose monitor monitoring kit Use to check blood sugar two times daily 3/9/20   Stan Calderon MD   hydroCHLOROthiazide (HYDRODIURIL) 12.5 MG tablet Take 1 tablet by mouth Daily. 6/17/20   Stan Calderon MD   ipratropium (ATROVENT) 0.06 % nasal spray 2 sprays into the nostril(s) as directed by provider 4 (Four) Times a Day. 12/5/19   Stan Calderon MD   Lancets misc Check blood sugar two times daily 3/9/20   Stan Calderon MD   lisinopril (PRINIVIL,ZESTRIL) 10 MG tablet Take 1 tablet by mouth Daily. 6/17/20   Stan Calderon MD   lisinopril-hydrochlorothiazide (PRINZIDE,ZESTORETIC) 10-12.5 MG per tablet Take 1 tablet by mouth Daily. 6/15/20   Stan Calderon MD   metFORMIN ER (GLUCOPHAGE-XR) 500 MG 24 hr tablet TAKE 1 TABLET BY MOUTH DAILY WITH BREAKFAST 8/3/20   Stan Calderon MD   metoprolol succinate XL (TOPROL-XL) 100 MG 24 hr tablet Take 1 tablet by mouth Daily. 6/15/20   Stan Calderon MD   OZEMPIC, 0.25 OR 0.5 MG/DOSE, 2 MG/1.5ML  solution pen-injector INJECT 0.25 MG WEEKLY FOR 4 WEEKS THEN INCREASE TO 0.5 MG WEEKLY THEREAFTER 6/15/20   Stan Calderon MD   pregabalin (LYRICA) 100 MG capsule TAKE ONE CAPSULE BY MOUTH THREE TIMES DAILY 10/14/19   Stan Calderon MD   Testosterone Cypionate (DEPO-TESTOSTERONE) 200 MG/ML injection Inject 1 mL into the appropriate muscle as directed by prescriber Every 14 (Fourteen) Days. 2/20/20   Stan Calderon MD   vardenafil (Levitra) 20 MG tablet Take 1 tablet by mouth Daily As Needed for Erectile Dysfunction. 6/15/20   Stan Calderon MD       No Known Allergies    Social History     Socioeconomic History   • Marital status:      Spouse name: Not on file   • Number of children: Not on file   • Years of education: Not on file   • Highest education level: Not on file   Tobacco Use   • Smoking status: Former Smoker   • Smokeless tobacco: Never Used   • Tobacco comment: smoked less than 1 pack per day for 15 years   Substance and Sexual Activity   • Alcohol use: Yes     Comment: occasionally-7 or less drinks per week   • Drug use: Yes     Types: Marijuana       Family History   Problem Relation Age of Onset   • Valvular heart disease Mother    • Arthritis Father    • Hypertension Father    • Diabetes type II Father    • No Known Problems Other        REVIEW OF SYSTEMS:   All systems reviewed.  Pertinent positives identified in HPI.  All other systems are negative.      Objective:     Vitals:    08/07/20 0000 08/07/20 0330 08/07/20 0343 08/07/20 0800   BP: 131/97   142/91   BP Location: Right arm   Left arm   Patient Position: Sitting   Lying   Pulse: 77   79   Resp: 18   18   Temp: 98.6 °F (37 °C)   97.8 °F (36.6 °C)   TempSrc: Oral   Oral   SpO2: 96% (!) 85% 95% 94%   Weight: (!) 147 kg (325 lb)      Height:         Body mass index is 44.08 kg/m².    Physical Exam:    General Appearance:    Alert, cooperative, no distress, appears stated age, morbidly obese   Head:    Normocephalic, without  obvious abnormality, atraumatic   Eyes:    PERRL,  EOM's intact, wearing glasses               Neck:   Supple, symmetrical, no carotid    bruit or difficult to assess JVD d/t body habitus   Back:     Symmetric, no curvature, ROM normal, no CVA tenderness   Lungs:     Clear to auscultation bilaterally, respirations unlabored   Chest Wall:    No tenderness or deformity    Heart:    Regular rate and rhythm, S1 and S2 normal, no murmur, rub   or gallop, occasional extrasystole   Abdomen:     Soft, non-tender, bowel sounds active all four quadrants,     obese   Extremities:   ELIZONDO, atraumatic, no cyanosis or pitting edema    Pulses:   Palpable radial/DP pulses bilaterally   Skin:   Skin color normal, Skin is warm and dry,  no rashes or palpable lesions   Neurologic:   A & O x 4       Lab Review:     Results from last 7 days   Lab Units 08/07/20  0612   SODIUM mmol/L 135*   POTASSIUM mmol/L 4.2   CHLORIDE mmol/L 102   CO2 mmol/L 23.4   BUN mg/dL 12   CREATININE mg/dL 0.79   CALCIUM mg/dL 8.9   BILIRUBIN mg/dL 0.4   ALK PHOS U/L 51   ALT (SGPT) U/L 20   AST (SGOT) U/L 23   GLUCOSE mg/dL 111*     Results from last 7 days   Lab Units 08/07/20  0612 08/07/20  0007 08/06/20  2119   TROPONIN T ng/mL 0.100* 0.030 <0.010     Results from last 7 days   Lab Units 08/07/20  0612   WBC 10*3/mm3 9.73   HEMOGLOBIN g/dL 17.5   HEMATOCRIT % 53.3*   PLATELETS 10*3/mm3 252             Results from last 7 days   Lab Units 08/07/20  0612   CHOLESTEROL mg/dL 185   TRIGLYCERIDES mg/dL 252*   HDL CHOL mg/dL 26*   LDL CHOL mg/dL 109*                 I personally viewed and interpreted the patient's EKG/Telemetry data.      Current Facility-Administered Medications:   •  aspirin tablet 325 mg, 325 mg, Oral, Once, Tae Caraballo MD  •  dextrose (D50W) 25 g/ 50mL Intravenous Solution 25 g, 25 g, Intravenous, Q15 Min PRN, Tae Caraballo MD  •  dextrose (GLUTOSE) oral gel 15 g, 15 g, Oral, Q15 Min PRN, Tae Caraballo MD  •  DULoxetine (CYMBALTA)   capsule 60 mg, 60 mg, Oral, Daily, Tae Caraballo MD  •  enoxaparin (LOVENOX) syringe 40 mg, 40 mg, Subcutaneous, Nightly, Tae Caraballo MD, Stopped at 08/06/20 0015  •  fluticasone (FLONASE) 50 MCG/ACT nasal spray 1 spray, 1 spray, Nasal, Daily, Tae Caraballo MD  •  glucagon (human recombinant) (GLUCAGEN DIAGNOSTIC) injection 1 mg, 1 mg, Subcutaneous, Q15 Min PRN, Tae Caraballo MD  •  insulin lispro (humaLOG) injection 0-9 Units, 0-9 Units, Subcutaneous, TID AC, Tae Caraballo MD  •  ipratropium (ATROVENT) nasal spray 0.06%, 2 spray, Each Nare, 4x Daily, Tae Caraballo MD  •  lisinopril (PRINIVIL,ZESTRIL) 10 mg, hydroCHLOROthiazide (HYDRODIURIL) 12.5 mg for ZESTORETIC 10-12.5, , Oral, Q24H, Tae Caraballo MD  •  metoprolol succinate XL (TOPROL-XL) 24 hr tablet 100 mg, 100 mg, Oral, Daily, Tae Caraballo MD  •  nitroglycerin (NITROSTAT) SL tablet 0.4 mg, 0.4 mg, Sublingual, Q5 Min PRN, Tae Caraballo MD  •  ondansetron (ZOFRAN) injection 4 mg, 4 mg, Intravenous, Q6H PRN, Tae Caraballo MD  •  pregabalin (LYRICA) capsule 100 mg, 100 mg, Oral, TID, Tae Caraballo MD  •  [COMPLETED] Insert peripheral IV, , , Once **AND** sodium chloride 0.9 % flush 10 mL, 10 mL, Intravenous, PRN, Tae Caraballo MD  •  sodium chloride 0.9 % flush 10 mL, 10 mL, Intravenous, Q12H, Tae Caraballo MD, 10 mL at 08/07/20 0422  •  sodium chloride 0.9 % flush 10 mL, 10 mL, Intravenous, PRN, Tae Caraballo MD  •  sodium chloride 0.9 % infusion, 100 mL/hr, Intravenous, Continuous, Tae Caraballo MD, Last Rate: 100 mL/hr at 08/07/20 0800, 100 mL/hr at 08/07/20 0800    Assessment and Plan:       Active Hospital Problems    Diagnosis  POA   • **Chest pain [R07.9]  Yes   • Hypertension [I10]  Yes   • Type 2 diabetes mellitus (CMS/HCC) [E11.9]  Yes   • BMI 40.0-44.9, adult (CMS/HCC) [Z68.41]  Not Applicable      Resolved Hospital Problems   No resolved problems to display.     1. Chest pain: Received 325 mg of aspirin per EMS yesterday.  Mild 2/10 chest pressure  lasting just a few minutes resolved while I was in the room.  2. Elevated troponin consistent with NSTEMI: to cath today. Add nitro paste,  Aspirin, statin. Continue with home beta blocker which he got today already.  3.  Hyperlipidemia: statin will be initiated  4.  Hypertension: Blood pressure borderline elevated: On metoprolol succinate 100 mg daily and lisinopril-HCTZ. Monitor. Recommend avoid NSAIDs  5. Diabetes: with neuropathy. On metformin and ozympic at home. A1c 7.00%. Defer to medicine.   6. Morbid obesity  7. Mild hyponatremia: Monitor on HCTZ  8. Suspected sleep apnea    For cath today.  Risk versus benefits discussed at length with the patient by both myself and Dr. Khoury.  Patient was agreeable to proceed.  Medication adjustments made for GDMT for NSTEMI. Echo and uric acid ordered.  He has numerous risk factors and we will continue to discuss risk factor modification post-cath.      Thank you for allowing me to participate in this patient's care. Please call me with any questions/concerns.         KATHRIN Acevedo  08/07/20  08:51

## 2020-08-07 NOTE — ED NOTES
Pt c/o left sided chest pain that started when he was walking across the room. Intermittent pain for last 3days. Todays onset this evening. Pain radiated down left arm reports transient soa and diaphoresis with onset. Decreased with rest. No fever, cough. Pt had negative covid yesterday.    Pt had mask on when placed in room. RN wore goggles and surgical mask upon entering room.      Yessenia De Luna, RN  08/06/20 [id number]

## 2020-08-07 NOTE — H&P
Internal medicine history and physical  INTERNAL MEDICINE   [facility]       Patient Identification:  Name: Eduardo Ramires  Age: 60 y.o.  Sex: male  :  1960  MRN: [id number]                   Primary Care Physician: Stan Calderon MD                               Date of admission:2020    Chief Complaint: Chest pain    History of Present Illness:   Patient is a 60-year-old male with past medical history as noted below started noticing some fleeting chest discomfort lasting 2 to 3 minutes this past Monday.  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  There was no associated exacerbating or relieving factor.  Without doing anything is been just go away and there was no associated symptoms.  Today the similar episode occurred but this time it lasted for more than 10 minutes and extended into an hour until it is completely resolved.  It was associated with shortness of breath and breaking out into sweats and nausea but no vomiting.  He got very concerned and decided to come to the emergency room by ambulance.  Patient is being admitted for further cardiac work-up.  He described pain going into his left arm with tingling.  He noticed the symptoms even with early episodes of chest pain but this was more pronounced and severe.  EMS did give him aspirin on the way to the emergency room.      Past Medical History:  Past Medical History:   Diagnosis Date   • Allergic rhinitis 2019   • Anal fistula 2019   • Arthritis 2019   • BMI 45.0-49.9, adult (CMS/[facility])    • BPH (benign prostatic hyperplasia) 2019   • Degeneration, intervertebral disc, thoracic 2019   • Degenerative joint disease 2019   • Degenerative joint disease of left hip    • Diabetic polyneuropathy (CMS/[facility]) 2019   • Elevated blood sugar    • Erectile dysfunction 2019   • High blood pressure    •  History of kidney stones    • Hypertension 8/26/2019   • Hypogonadism male 8/26/2019   • Left hip pain    • Nephrolithiasis    • Peripheral neuropathy 8/26/2019   • Preoperative clearance    • Refused influenza vaccine    • Right knee pain 8/26/2019   • Synovial cyst popliteal space    • Thoracic back pain 8/26/2019   • Type 2 diabetes mellitus (CMS/HCC) 8/26/2019     Past Surgical History:  Past Surgical History:   Procedure Laterality Date   • BACK SURGERY      Lower Back Surgery   • COLONOSCOPY  2010    normal   • KIDNEY STONE SURGERY  2016    laser stone surgery   • LUMBAR SPINE SURGERY  1989   • SHOULDER ROTATOR CUFF REPAIR Left 2012   • TOTAL HIP ARTHROPLASTY Left 12/10/2018    Dr Barraza      Home Meds:  Medications Prior to Admission   Medication Sig Dispense Refill Last Dose   • cefdinir (OMNICEF) 300 MG capsule Take 1 capsule by mouth 2 (Two) Times a Day. 14 capsule 0 Not Taking   • diclofenac (VOLTAREN) 75 MG EC tablet Take 1 tablet by mouth 2 (Two) Times a Day. 180 tablet 1    • DULoxetine (CYMBALTA) 60 MG capsule TK 1 C PO BID  5 Taking   • fluticasone (FLONASE) 50 MCG/ACT nasal spray 1 spray into the nostril(s) as directed by provider.   Taking   • glucose blood test strip Use as instructed and test blood sugar two times daily 200 each 12 Taking   • glucose monitor monitoring kit Use to check blood sugar two times daily 1 each 0 Taking   • hydroCHLOROthiazide (HYDRODIURIL) 12.5 MG tablet Take 1 tablet by mouth Daily. 90 tablet 0    • ipratropium (ATROVENT) 0.06 % nasal spray 2 sprays into the nostril(s) as directed by provider 4 (Four) Times a Day. 1 each 12 Taking   • Lancets misc Check blood sugar two times daily 200 each 10 Taking   • lisinopril (PRINIVIL,ZESTRIL) 10 MG tablet Take 1 tablet by mouth Daily. 90 tablet 0    • lisinopril-hydrochlorothiazide (PRINZIDE,ZESTORETIC) 10-12.5 MG per tablet Take 1 tablet by mouth Daily. 90 tablet 1    • metFORMIN ER (GLUCOPHAGE-XR) 500 MG 24 hr tablet TAKE 1  TABLET BY MOUTH DAILY WITH BREAKFAST 90 tablet 1    • metoprolol succinate XL (TOPROL-XL) 100 MG 24 hr tablet Take 1 tablet by mouth Daily. 90 tablet 1    • OZEMPIC, 0.25 OR 0.5 MG/DOSE, 2 MG/1.5ML solution pen-injector INJECT 0.25 MG WEEKLY FOR 4 WEEKS THEN INCREASE TO 0.5 MG WEEKLY THEREAFTER 1.5 mL 3 Taking   • pregabalin (LYRICA) 100 MG capsule TAKE ONE CAPSULE BY MOUTH THREE TIMES DAILY 90 capsule 2 Taking   • Testosterone Cypionate (DEPO-TESTOSTERONE) 200 MG/ML injection Inject 1 mL into the appropriate muscle as directed by prescriber Every 14 (Fourteen) Days. 2 mL 4 Taking   • vardenafil (Levitra) 20 MG tablet Take 1 tablet by mouth Daily As Needed for Erectile Dysfunction. 10 tablet 3      Current Meds:     Current Facility-Administered Medications:   •  aspirin tablet 325 mg, 325 mg, Oral, Once, Geovanny Franz MD  •  [COMPLETED] Insert peripheral IV, , , Once **AND** sodium chloride 0.9 % flush 10 mL, 10 mL, Intravenous, PRN, Geovanny Franz MD  Allergies:  No Known Allergies  Social History:   Social History     Tobacco Use   • Smoking status: Former Smoker   • Smokeless tobacco: Never Used   • Tobacco comment: smoked less than 1 pack per day for 15 years   Substance Use Topics   • Alcohol use: Yes     Comment: occasionally-7 or less drinks per week      Family History:  Family History   Problem Relation Age of Onset   • Valvular heart disease Mother    • Arthritis Father    • Hypertension Father    • Diabetes type II Father    • No Known Problems Other           Review of Systems  See history of present illness and past medical history.    Constitutional: Remarkable for no fever or chills  Cardiovascular: Markable for chest pains as described  Respiratory: Remarkable for associated shortness of breath now resolved  GI: Remarkable for nausea but no vomiting it is improved  : Remarkable for no burning in urination frequency urgency  Musculoskeletal: Remarkable for no specific joint aches and  "pain  Neurological: Remarkable for no loss of consciousness incontinence.      Vitals:   BP (!) 149/105   Pulse 73   Temp 98.2 °F (36.8 °C) (Tympanic)   Resp 18   Ht 182.9 cm (72\")   Wt (!) 147 kg (325 lb)   SpO2 96%   BMI 44.08 kg/m²   I/O: No intake or output data in the 24 hours ending 08/06/20 1925  Exam:  Patient is examined using the personal protective equipment as per guidelines from infection control for this particular patient as enacted.  Hand washing was performed before and after patient interaction.  General Appearance:    Alert, cooperative, no distress, appears stated age, currently chest pain-free.   Head:    Normocephalic, without obvious abnormality, atraumatic   Eyes:    PERRL, conjunctiva/corneas clear, EOM's intact, both eyes   Ears:    Normal external ear canals, both ears   Nose:   Nares normal, septum midline, mucosa normal, no drainage    or sinus tenderness   Throat:   Lips, tongue, gums normal; oral mucosa pink and moist   Neck:   Supple, symmetrical, trachea midline, no adenopathy;     thyroid:  no enlargement/tenderness/nodules; no carotid    bruit or JVD   Back:     Symmetric, no curvature, ROM normal, no CVA tenderness   Lungs:     Clear to auscultation bilaterally, respirations unlabored   Chest Wall:    No tenderness or deformity    Heart:    Regular rate and rhythm, S1 and S2 normal, no murmur, rub   or gallop   Abdomen:     Soft, non-tender, bowel sounds active all four quadrants,     no masses, no hepatomegaly, no splenomegaly   Extremities:   Extremities normal, atraumatic, no cyanosis or edema   Pulses:   Pulses palpable in all extremities; symmetric all extremities   Skin:   Skin color normal, Skin is warm and dry,  no rashes or palpable lesions   Neurologic:   CNII-XII intact, motor strength grossly intact, sensation grossly intact to light touch, no focal deficits noted       Data Review:      I reviewed the patient's new clinical results.  Results from last 7 days "   Lab Units 08/06/20  2119   WBC 10*3/mm3 8.53   HEMOGLOBIN g/dL 17.2   PLATELETS 10*3/mm3 257     Results from last 7 days   Lab Units 08/06/20  2119   SODIUM mmol/L 133*   POTASSIUM mmol/L 4.3   CHLORIDE mmol/L 100   CO2 mmol/L 25.4   BUN mg/dL 14   CREATININE mg/dL 0.95   CALCIUM mg/dL 9.0   GLUCOSE mg/dL 202*     ECG 12 Lead   Preliminary Result   HEART RATE= 78  bpm   RR Interval= 772  ms   DC Interval= 166  ms   P Horizontal Axis= 16  deg   P Front Axis= 59  deg   QRSD Interval= 89  ms   QT Interval= 365  ms   QRS Axis= -31  deg   T Wave Axis= -24  deg   - BORDERLINE ECG -   Sinus rhythm   Atrial premature complex   Left ventricular hypertrophy   Borderline T abnormalities, diffuse leads   Electronically Signed By:    Date and Time of Study: 2020-08-06 21:14:25        Microbiology Results (last 10 days)     ** No results found for the last 240 hours. **      No radiology results for the last 30 days.      Assessment:  Active Hospital Problems    Diagnosis POA   • **Chest pain [R07.9] Yes   • Hypertension [I10] Yes   • Type 2 diabetes mellitus (CMS/HCC) [E11.9] Yes   • BMI 40.0-44.9, adult (CMS/HCC) [Z68.41] Not Applicable       Medical decision making:  Chest pain in the setting of risk factors with left arm involvement with initial troponin negative and EKG showing some ST segment changes-this is concerning for angina and also possible evolving acute coronary syndrome.-Plan is to continue with aspirin provide him with DVT prophylaxis with Lovenox monitor his chest pain repeat EKG and troponin in the morning.  Cardiology consultation.  Let them decide if patient needs stress test versus invasive studies such as left heart catheterization based on the risk factors and pretest probability of underlying coronary artery disease.  Hypertension-continue antihypertensive regimen  Diabetes-continue his current regimen hold his metformin in case if he needs left heart catheterization provide him with Accu-Chek sliding  scale coverage and low-dose insulin and monitor for hypoglycemia.      Tae Caraballo MD   8/6/2020  23:55  Much of this encounter note is an electronic transcription/translation of spoken language to printed text. The electronic translation of spoken language may permit erroneous, or at times, nonsensical words or phrases to be inadvertently transcribed; Although I have reviewed the note for such errors, some may still exist

## 2020-08-07 NOTE — PROGRESS NOTES
Name: Eduardo Ramires ADMIT: 2020   : 1960  PCP: Stan Calderon MD    MRN: [id number] LOS: 0 days   AGE/SEX: 60 y.o. male  ROOM: [place]/[facility]     Subjective   Subjective   Tearful. Continues to have chest discomfort described as pressure. Denies dyspnea. Wife at bedside    Review of Systems   Constitutional: Negative.    HENT: Negative.    Respiratory: Negative.    Cardiovascular: Positive for chest pain.   Gastrointestinal: Negative.    Genitourinary: Negative.    Musculoskeletal: Positive for arthralgias.   Skin: Negative.    Neurological: Negative.    Psychiatric/Behavioral: Negative.         Objective   Objective   Vital Signs  Temp:  [97.8 °F (36.6 °C)-98.6 °F (37 °C)] 98.2 °F (36.8 °C)  Heart Rate:  [68-85] 81  Resp:  [16-18] 18  BP: (119-149)/() 121/80  SpO2:  [85 %-97 %] 95 %  on  Flow (L/min):  [1] 1;   Device (Oxygen Therapy): room air  Body mass index is 44.08 kg/m².  Physical Exam   Constitutional: He is oriented to person, place, and time. He appears well-developed. No distress.   HENT:   Head: Normocephalic.   Eyes: Conjunctivae are normal.   Neck: Neck supple. No JVD present.   Cardiovascular: Normal rate and regular rhythm.   Pulmonary/Chest: Effort normal and breath sounds normal. No respiratory distress.   Abdominal: Soft. Bowel sounds are normal.   Musculoskeletal: He exhibits no edema.   Neurological: He is alert and oriented to person, place, and time.   Skin: Skin is warm and dry.   Psychiatric:   Tearful about current health status   Vitals reviewed.      Results Review:       I reviewed the patient's new clinical results.  Results from last 7 days   Lab Units 20  0612 20  2119   WBC 10*3/mm3 9.73 8.53   HEMOGLOBIN g/dL 17.5 17.2   PLATELETS 10*3/mm3 252 257     Results from last 7 days   Lab Units 20  0612 20  2119   SODIUM mmol/L 135* 133*   POTASSIUM mmol/L 4.2 4.3   CHLORIDE mmol/L 102 100   CO2 mmol/L 23.4 25.4   BUN mg/dL 12 14   CREATININE  mg/dL 0.79 0.95   GLUCOSE mg/dL 111* 202*   Estimated Creatinine Clearance: 147.7 mL/min (by C-G formula based on SCr of 0.79 mg/dL).  Results from last 7 days   Lab Units 08/07/20  0612 08/06/20  2119   ALBUMIN g/dL 3.50 3.60   BILIRUBIN mg/dL 0.4 0.4   ALK PHOS U/L 51 52   AST (SGOT) U/L 23 15   ALT (SGPT) U/L 20 20     Results from last 7 days   Lab Units 08/07/20  0612 08/06/20  2119   CALCIUM mg/dL 8.9 9.0   ALBUMIN g/dL 3.50 3.60       Results from last 7 days   Lab Units 08/07/20  1042   COVID19  Not Detected       Hemoglobin A1C   Date/Time Value Ref Range Status   08/07/2020 0612 7.00 (H) 4.80 - 5.60 % Final     Glucose   Date/Time Value Ref Range Status   08/07/2020 1057 121 70 - 130 mg/dL Final   08/07/2020 0601 110 70 - 130 mg/dL Final       XR Chest 1 View  PORTABLE CHEST 08/06/2020 AT 0859 HOURS     CLINICAL HISTORY: Chest     The lungs appear well-expanded and are free of infiltrates. There are no  pleural effusions. The cardiomediastinal silhouette is unremarkable.     IMPRESSIONS: Normal portable chest x-ray.     This report was finalized on 8/6/2020 10:58 PM by Dr. Sammy Roger M.D.           [MAR Hold] aspirin 81 mg Oral Daily   [MAR Hold] atorvastatin 40 mg Oral Nightly   [MAR Hold] DULoxetine 60 mg Oral Daily   [MAR Hold] fluticasone 1 spray Nasal Daily   [MAR Hold] hydroCHLOROthiazide Oral 12.5 mg Oral Daily   [MAR Hold] insulin lispro 0-9 Units Subcutaneous TID AC   [MAR Hold] ipratropium 2 spray Each Nare 4x Daily   lisinopril 20 mg Oral Q24H   [START ON 8/8/2020] metoprolol succinate  mg Oral Q24H   pregabalin 100 mg Oral TID   [MAR Hold] sodium chloride 10 mL Intravenous Q12H       sodium chloride 100 mL/hr Last Rate: 100 mL/hr (08/07/20 1207)   NPO Diet       Assessment/Plan     Active Hospital Problems    Diagnosis  POA   • **Chest pain [R07.9]  Yes   • NSTEMI, initial episode of care (CMS/[facility]) [I21.4]  Unknown   • Hypertension [I10]  Yes   • Type 2 diabetes mellitus (CMS/[facility])  [E11.9]  Yes   • BMI 40.0-44.9, adult (CMS/[facility]) [Z68.41]  Not Applicable      Resolved Hospital Problems   No resolved problems to display.       60 y.o. male admitted with Chest pain.    -Chest pain/NSTEMI: Cardiac cath today. Trop elevated. Nitro paste, ASA, statin; continue beta blocker. Appreciate Cardiology assistance appreciated. Plan for Echo.   -DM: A1C 7.00%. Monitor BG trends. Oral meds on hold. Correctional scale insulin  -HTN: Continue HCTZ. Na level stable  -Obesity: BMI 44  -HLD: Statin     · SCDs for DVT prophylaxis.  · Full code.  · Discussed with patient and family.  · Anticipate discharge TBD.        KATHRIN Crespo  [facility]  08/07/20  13:57    I wore protective equipment throughout this patient encounter including a face mask, gloves and protective eyewear.  Hand hygiene was performed before donning protective equipment and after removal when leaving the room.

## 2020-08-07 NOTE — ED NOTES
Pt to triage from home via [facility] [id number] - c/o chest pain since Monday, denies fever or covid exposure, but has soa and cough. Tested yesterday.  Today had stabbing chest pain while going to bathroom. Unsure of radiation.  Pt provided with mask in triage.  Triage personnel wore appropriate PPE.       Betty Santos RN  08/06/20 1056

## 2020-08-07 NOTE — ED PROVIDER NOTES
EMERGENCY DEPARTMENT ENCOUNTER    CHIEF COMPLAINT  Chief Complaint: Chest pain  History given by: Patient  History limited by: None  Room Number: S419/1  PMD: Stan Calderon MD      HPI:  Pt is a 60 y.o. male who presents complaining of sharp left-sided chest pain with radiation into his shoulder and arm that has been intermittently present for the last 3 days but became acutely worse 2 hours ago.  The patient states that he had no pain and then got up to go to the restroom and suddenly had this sharp left-sided chest pain with radiation with associated shortness of breath and diaphoresis.  The patient states that he sat down and rested following this discomfort and symptoms resolved.  The patient denies any associated fevers or chills, cough, nausea/vomiting, back pain, abdominal pain, or known sick contacts.  The patient states he has never had symptoms like this thus far and denies any history of cardiac or lung disease.  The patient was given an aspirin prior to arrival.  At the onset of symptoms nothing exacerbated the symptoms but they were significantly improved with rest.  The patient currently has no complaints of chest pain.      PAST MEDICAL HISTORY  Active Ambulatory Problems     Diagnosis Date Noted   • Allergic rhinitis 08/26/2019   • Anal fistula 08/26/2019   • Arthritis 08/26/2019   • BPH (benign prostatic hyperplasia) 08/26/2019   • Degeneration, intervertebral disc, thoracic 08/26/2019   • Degenerative joint disease 08/26/2019   • Diabetic polyneuropathy (CMS/[facility]) 08/26/2019   • Hypertension 08/26/2019   • Erectile dysfunction 08/26/2019   • Hypogonadism male 08/26/2019   • Type 2 diabetes mellitus (CMS/[facility]) 08/26/2019   • Peripheral neuropathy 08/26/2019   • Right knee pain 08/26/2019   • Thoracic back pain 08/26/2019   • BMI 40.0-44.9, adult (CMS/[facility]) 08/26/2019   • Otitis media, serous, acute, without rupture 12/05/2019   • Rhinitis 12/05/2019   • Colon cancer screening 12/05/2019   •  Encounter for hepatitis C screening test for low risk patient 02/20/2020     Resolved Ambulatory Problems     Diagnosis Date Noted   • Acute mucoid otitis media of left ear 02/20/2020     Past Medical History:   Diagnosis Date   • BMI 45.0-49.9, adult (CMS/HCC)    • Degenerative joint disease of left hip    • Elevated blood sugar    • High blood pressure    • History of kidney stones    • Left hip pain    • Nephrolithiasis    • Preoperative clearance    • Refused influenza vaccine    • Synovial cyst popliteal space        PAST SURGICAL HISTORY  Past Surgical History:   Procedure Laterality Date   • BACK SURGERY      Lower Back Surgery   • COLONOSCOPY  2010    normal   • KIDNEY STONE SURGERY  2016    laser stone surgery   • LUMBAR SPINE SURGERY  1989   • SHOULDER ROTATOR CUFF REPAIR Left 2012   • TOTAL HIP ARTHROPLASTY Left 12/10/2018    Dr Barraza       FAMILY HISTORY  Family History   Problem Relation Age of Onset   • Valvular heart disease Mother    • Arthritis Father    • Hypertension Father    • Diabetes type II Father    • No Known Problems Other        SOCIAL HISTORY  Social History     Socioeconomic History   • Marital status:      Spouse name: Not on file   • Number of children: Not on file   • Years of education: Not on file   • Highest education level: Not on file   Tobacco Use   • Smoking status: Former Smoker   • Smokeless tobacco: Never Used   • Tobacco comment: smoked less than 1 pack per day for 15 years   Substance and Sexual Activity   • Alcohol use: Yes     Comment: occasionally-7 or less drinks per week   • Drug use: Yes     Types: Marijuana       ALLERGIES  Patient has no known allergies.    REVIEW OF SYSTEMS  Review of Systems   Constitutional: Positive for diaphoresis. Negative for activity change, appetite change and fever.   HENT: Negative for congestion and sore throat.    Eyes: Negative.    Respiratory: Positive for shortness of breath. Negative for cough.    Cardiovascular: Positive  for chest pain. Negative for leg swelling.   Gastrointestinal: Negative for abdominal pain, diarrhea and vomiting.   Endocrine: Negative.    Genitourinary: Negative for decreased urine volume and dysuria.   Musculoskeletal: Negative for neck pain.   Skin: Negative for rash and wound.   Allergic/Immunologic: Negative.    Neurological: Negative for weakness, numbness and headaches.   Hematological: Negative.    Psychiatric/Behavioral: Negative.    All other systems reviewed and are negative.      PHYSICAL EXAM  ED Triage Vitals [08/06/20 2049]   Temp Heart Rate Resp BP SpO2   98.2 °F (36.8 °C) 82 16 120/95 95 %      Temp src Heart Rate Source Patient Position BP Location FiO2 (%)   Tympanic Monitor -- -- --       Physical Exam   Constitutional: He is oriented to person, place, and time. No distress.   HENT:   Head: Normocephalic and atraumatic.   Eyes: Pupils are equal, round, and reactive to light. EOM are normal.   Neck: Normal range of motion. Neck supple.   Cardiovascular: Normal rate, regular rhythm and normal heart sounds.   Pulmonary/Chest: Effort normal and breath sounds normal. No respiratory distress.   Abdominal: Soft. There is no tenderness. There is no rebound and no guarding.   Musculoskeletal: Normal range of motion. He exhibits no edema.   Neurological: He is alert and oriented to person, place, and time. He has normal sensation and normal strength.   Skin: Skin is warm and dry.   Psychiatric: Mood and affect normal.   Nursing note and vitals reviewed.      LAB RESULTS  Lab Results (last 24 hours)     Procedure Component Value Units Date/Time    CBC & Differential [[id number]] Collected:  08/06/20 2119    Specimen:  Blood Updated:  08/06/20 2127    Narrative:       The following orders were created for panel order CBC & Differential.  Procedure                               Abnormality         Status                     ---------                               -----------         ------                      CBC Auto Differential[[id number]]        Abnormal            Final result                 Please view results for these tests on the individual orders.    Comprehensive Metabolic Panel [[id number]]  (Abnormal) Collected:  08/06/20 2119    Specimen:  Blood Updated:  08/06/20 2147     Glucose 202 mg/dL      BUN 14 mg/dL      Creatinine 0.95 mg/dL      Sodium 133 mmol/L      Potassium 4.3 mmol/L      Comment: Specimen hemolyzed.  Results may be affected.        Chloride 100 mmol/L      CO2 25.4 mmol/L      Calcium 9.0 mg/dL      Total Protein 6.7 g/dL      Albumin 3.60 g/dL      ALT (SGPT) 20 U/L      AST (SGOT) 15 U/L      Alkaline Phosphatase 52 U/L      Total Bilirubin 0.4 mg/dL      eGFR Non African Amer 81 mL/min/1.73      Globulin 3.1 gm/dL      A/G Ratio 1.2 g/dL      BUN/Creatinine Ratio 14.7     Anion Gap 7.6 mmol/L     Narrative:       GFR Normal >60  Chronic Kidney Disease <60  Kidney Failure <15      Troponin [[id number]]  (Normal) Collected:  08/06/20 2119    Specimen:  Blood Updated:  08/06/20 2150     Troponin T <0.010 ng/mL     Narrative:       Troponin T Reference Range:  <= 0.03 ng/mL-   Negative for AMI  >0.03 ng/mL-     Abnormal for myocardial necrosis.  Clinicians would have to utilize clinical acumen, EKG, Troponin and serial changes to determine if it is an Acute Myocardial Infarction or myocardial injury due to an underlying chronic condition.       Results may be falsely decreased if patient taking Biotin.      BNP [[id number]]  (Normal) Collected:  08/06/20 2119    Specimen:  Blood Updated:  08/06/20 2150     proBNP 297.5 pg/mL     Narrative:       Among patients with dyspnea, NT-proBNP is highly sensitive for the detection of acute congestive heart failure. In addition NT-proBNP of <300 pg/ml effectively rules out acute congestive heart failure with 99% negative predictive value.    Results may be falsely decreased if patient taking Biotin.      CBC Auto Differential [[id number]]  (Abnormal)  Collected:  08/06/20 2119    Specimen:  Blood Updated:  08/06/20 2127     WBC 8.53 10*3/mm3      RBC 6.09 10*6/mm3      Hemoglobin 17.2 g/dL      Hematocrit 53.2 %      MCV 87.4 fL      MCH 28.2 pg      MCHC 32.3 g/dL      RDW 16.4 %      RDW-SD 49.3 fl      MPV 10.1 fL      Platelets 257 10*3/mm3      Neutrophil % 73.2 %      Lymphocyte % 15.4 %      Monocyte % 7.3 %      Eosinophil % 3.2 %      Basophil % 0.5 %      Immature Grans % 0.4 %      Neutrophils, Absolute 6.26 10*3/mm3      Lymphocytes, Absolute 1.31 10*3/mm3      Monocytes, Absolute 0.62 10*3/mm3      Eosinophils, Absolute 0.27 10*3/mm3      Basophils, Absolute 0.04 10*3/mm3      Immature Grans, Absolute 0.03 10*3/mm3      nRBC 0.0 /100 WBC     Troponin [[id number]]  (Normal) Collected:  08/07/20 0007    Specimen:  Blood Updated:  08/07/20 0035     Troponin T 0.030 ng/mL     Narrative:       Troponin T Reference Range:  <= 0.03 ng/mL-   Negative for AMI  >0.03 ng/mL-     Abnormal for myocardial necrosis.  Clinicians would have to utilize clinical acumen, EKG, Troponin and serial changes to determine if it is an Acute Myocardial Infarction or myocardial injury due to an underlying chronic condition.       Results may be falsely decreased if patient taking Biotin.            I ordered the above labs and reviewed the results    RADIOLOGY  XR Chest 1 View   Final Result           I ordered the above noted radiological studies. Interpreted by radiologist.  Reviewed by me in PACS.       PROCEDURES  Procedures  EKG          EKG time: 2114  Rhythm/Rate: NSR, 78  P waves and DE: nml  QRS, axis: nml, nml   ST and T waves: mild ST depression inf/lat, no ST elevation      Interpreted Contemporaneously by me, independently viewed  No previous EKG available for comparison      PROGRESS AND CONSULTS     The patient was wearing a facemask upon entrance into the room and remained in such throughout their visit.  I was wearing PPE including a facemask, eye protection, as  well as gloves at any point entering the room and throughout the visit    2220  Upon reevaluation, the patient remains chest pain-free and without any further symptoms.  Vital signs reviewed and stable.  I did discuss with the patient his significant cardiac risk factors and worrisome story for cardiac disease.  I recommended to the patient that we admit him overnight for cardiac evaluation to which he and his family agreed.  All questions have been answered.    2230  Case discussed with Dr. Berry cardiology, who will see the patient in consult and is requested the patient to be admitted to the hospitalist.    2240  Case discussed with MAYLIN Patel, who will admit the patient to the hospital.    2245  Upon asking, the patient states that he was tested for COVID-19 3 days ago and received his negative result via email today.      MEDICAL DECISION MAKING  Results were reviewed/discussed with the patient and they were also made aware of online access. Pt also made aware that some labs, such as cultures, will not be resulted during ER visit and follow up with PMD is necessary.     MDM  Number of Diagnoses or Management Options  Chest pain, unspecified type:      Amount and/or Complexity of Data Reviewed  Clinical lab tests: ordered and reviewed  Tests in the radiology section of CPT®: ordered and reviewed  Tests in the medicine section of CPT®: ordered and reviewed  Review and summarize past medical records: yes (Upon medical records review, the patient was last seen and evaluated on 12/6/2019 secondary to acute right shoulder pain)  Discuss the patient with other providers: yes (Dr. Berry, cardiology, who will see in consultation.  MAYLIN Patel, who will admit the patient to the hospital)  Independent visualization of images, tracings, or specimens: yes (Unremarkable chest x-ray)           DIAGNOSIS  Final diagnoses:   Chest pain, unspecified type       DISPOSITION  ADMISSION    Discussed treatment  plan and reason for admission with pt/family and admitting physician.  Pt/family voiced understanding of the plan for admission for further testing/treatment as needed.         Latest Documented Vital Signs:  As of 03:05  BP- 131/97 HR- 77 Temp- 98.6 °F (37 °C) (Oral) O2 sat- 96%           Geovanny Franz MD  08/07/20 3201

## 2020-08-07 NOTE — DISCHARGE INSTRUCTIONS
[facility]  [address] [place] [address]    Coronary Angiogram with Stent (Radial Approach) After Care    Refer to this sheet in the next few weeks. These instructions provide you with information on caring for yourself after your procedure. Your health care provider may also give you more specific instructions. Your treatment has been planned according to current medical practices, but problems sometimes occur. Call your health care provider if you have any problems or questions after your procedure.       Home Care Instructions:  · You may shower the day after the procedure. Remove the bandage (dressing) and gently wash the site with plain soap and water. Gently pat the site dry. You may apply a band aid daily for 2 days if desired.    · Do not apply powder or lotion to the site.  · Do not submerge the affected site in water for 3 to 5 days or until the site is completely healed.   · Do not flex or bend the affected arm for 24 hours.  · Do not lift, push or pull anything over 5 pounds for 5 days after your procedure.  For a reference, a gallon of milk weighs 8 pounds.    · Do not operate machinery or power tools for 24 hours.  · Inspect the site at least twice daily. You may notice some bruising at the site and it may be tender for 1 to 2 weeks.     · Increase your fluid intake for the next 2 days.    · Keep arm elevated for 24 hours.  For the remainder of the day, keep your arm in the “Pledge of Allegiance” position when up and about.    · Limit your activity for the next 48 hours and avoid strenuous activity as directed by your physician.   · Cardiac Rehab may or may not be ordered.  Please consult with your physician  · You may drive 24 hours after the procedure unless otherwise instructed by your caregiver.  · A responsible adult should be with you for the first 24 hours after you arrive home.   · Do not make any important legal decisions or sign legal papers for 24 hours. Do not drink  alcohol for 24 hours.    · Take medicines only as directed by your health care provider.  Blood thinners may be prescribed after your procedure to improve blood flow through the stent.    · Metformin or any medications containing Metformin should not be taken for 48 hours after your procedure.    · Eat a heart-healthy diet. This should include plenty of fresh fruits and vegetables. Meat should be lean cuts. Avoid the following types of food:    ¨ Food that is high in salt.    ¨ Canned or highly processed food.    ¨ Food that is high in saturated fat or sugar.    ¨ Fried food.    · Make any other lifestyle changes recommended by your health care provider. This may include:    ¨ Not using any tobacco products including cigarettes, chewing tobacco, or electronic cigarettes.   ¨ Managing your weight.    ¨ Getting regular exercise.    ¨ Managing your blood pressure.    ¨ Limiting your alcohol intake.    ¨ Managing other health problems, such as diabetes.    · If you need an MRI after your heart stent was placed, be sure to tell the health care provider who orders the MRI that you have a heart stent.    · Keep all follow-up visits as directed by your health care provider.    ·   Call Your Doctor If:  · You have unusual pain at the radial/ulnar (wrist) site.  · You have redness, warmth, swelling, or pain at the radial/ulnar (wrist) site.  · You have drainage (other than a small amount of blood on the dressing).  · `You have chills or a fever > 101.  · Your arm becomes pale or dark, cool, tingly, or numb.  · You develop chest pain, shortness of breath, feel faint or pass out.    · You have heavy bleeding from the site, hold pressure on the site for 20 minutes.  If the bleeding stops, apply a fresh bandage and call your cardiologist.  However, if you continue to have bleeding, call 911.        Make Sure You:   · Understand these instructions.  · Will watch your condition.  · Will get help right away if you are not doing well  or get worse.

## 2020-08-07 NOTE — PLAN OF CARE
Problem: Patient Care Overview  Goal: Plan of Care Review  8/7/2020 1849 by Latha Livingston RN  Flowsheets  Taken 8/7/2020 1849  Progress: improving  Taken 8/7/2020 1848  Plan of Care Reviewed With: patient  Taken 8/7/2020 1844  Outcome Summary: Patient came from cath lab. R radial site clean, dry and intact. Integrillin gtt @ 20 ml/hr.

## 2020-08-08 ENCOUNTER — READMISSION MANAGEMENT (OUTPATIENT)
Dept: CALL CENTER | Facility: HOSPITAL | Age: 60
End: 2020-08-08

## 2020-08-08 ENCOUNTER — APPOINTMENT (OUTPATIENT)
Dept: CARDIOLOGY | Facility: HOSPITAL | Age: 60
End: 2020-08-08

## 2020-08-08 VITALS
HEIGHT: 72 IN | OXYGEN SATURATION: 95 % | RESPIRATION RATE: 16 BRPM | HEART RATE: 71 BPM | TEMPERATURE: 98 F | DIASTOLIC BLOOD PRESSURE: 73 MMHG | BODY MASS INDEX: 42.66 KG/M2 | SYSTOLIC BLOOD PRESSURE: 138 MMHG | WEIGHT: 315 LBS

## 2020-08-08 LAB
ANION GAP SERPL CALCULATED.3IONS-SCNC: 10.9 MMOL/L (ref 5–15)
BH CV ECHO MEAS - AO MAX PG (FULL): 2.4 MMHG
BH CV ECHO MEAS - AO MAX PG: 6.1 MMHG
BH CV ECHO MEAS - AO ROOT AREA (BSA CORRECTED): 1.4
BH CV ECHO MEAS - AO ROOT AREA: 10.2 CM^2
BH CV ECHO MEAS - AO ROOT DIAM: 3.6 CM
BH CV ECHO MEAS - AO V2 MAX: 123.5 CM/SEC
BH CV ECHO MEAS - AVA(V,A): 3 CM^2
BH CV ECHO MEAS - AVA(V,D): 3 CM^2
BH CV ECHO MEAS - BSA(HAYCOCK): 2.8 M^2
BH CV ECHO MEAS - BSA: 2.6 M^2
BH CV ECHO MEAS - BZI_BMI: 44.4 KILOGRAMS/M^2
BH CV ECHO MEAS - BZI_METRIC_HEIGHT: 182 CM
BH CV ECHO MEAS - BZI_METRIC_WEIGHT: 147 KG
BH CV ECHO MEAS - EDV(CUBED): 93.7 ML
BH CV ECHO MEAS - EDV(MOD-SP2): 65 ML
BH CV ECHO MEAS - EDV(MOD-SP4): 79 ML
BH CV ECHO MEAS - EDV(TEICH): 94.5 ML
BH CV ECHO MEAS - EF(CUBED): 67.2 %
BH CV ECHO MEAS - EF(MOD-SP2): 64.6 %
BH CV ECHO MEAS - EF(MOD-SP4): 70.9 %
BH CV ECHO MEAS - EF(TEICH): 58.8 %
BH CV ECHO MEAS - ESV(CUBED): 30.7 ML
BH CV ECHO MEAS - ESV(MOD-SP2): 23 ML
BH CV ECHO MEAS - ESV(MOD-SP4): 23 ML
BH CV ECHO MEAS - ESV(TEICH): 38.9 ML
BH CV ECHO MEAS - FS: 31 %
BH CV ECHO MEAS - IVS/LVPW: 1.2
BH CV ECHO MEAS - IVSD: 1.5 CM
BH CV ECHO MEAS - LAT PEAK E' VEL: 11 CM/SEC
BH CV ECHO MEAS - LV DIASTOLIC VOL/BSA (35-75): 30.3 ML/M^2
BH CV ECHO MEAS - LV MASS(C)D: 248.1 GRAMS
BH CV ECHO MEAS - LV MASS(C)DI: 95.2 GRAMS/M^2
BH CV ECHO MEAS - LV MAX PG: 3.7 MMHG
BH CV ECHO MEAS - LV MEAN PG: 1.6 MMHG
BH CV ECHO MEAS - LV SYSTOLIC VOL/BSA (12-30): 8.8 ML/M^2
BH CV ECHO MEAS - LV V1 MAX: 96.5 CM/SEC
BH CV ECHO MEAS - LV V1 MEAN: 59.6 CM/SEC
BH CV ECHO MEAS - LV V1 VTI: 16 CM
BH CV ECHO MEAS - LVIDD: 4.5 CM
BH CV ECHO MEAS - LVIDS: 3.1 CM
BH CV ECHO MEAS - LVLD AP2: 7.8 CM
BH CV ECHO MEAS - LVLD AP4: 7.2 CM
BH CV ECHO MEAS - LVLS AP2: 7.3 CM
BH CV ECHO MEAS - LVLS AP4: 5.7 CM
BH CV ECHO MEAS - LVOT AREA (M): 3.8 CM^2
BH CV ECHO MEAS - LVOT AREA: 3.9 CM^2
BH CV ECHO MEAS - LVOT DIAM: 2.2 CM
BH CV ECHO MEAS - LVPWD: 1.3 CM
BH CV ECHO MEAS - MED PEAK E' VEL: 5.4 CM/SEC
BH CV ECHO MEAS - MV A DUR: 116 SEC
BH CV ECHO MEAS - MV A MAX VEL: 82.5 CM/SEC
BH CV ECHO MEAS - MV DEC SLOPE: 423.7 CM/SEC^2
BH CV ECHO MEAS - MV DEC TIME: 166 SEC
BH CV ECHO MEAS - MV E MAX VEL: 66.5 CM/SEC
BH CV ECHO MEAS - MV E/A: 0.81
BH CV ECHO MEAS - MV MAX PG: 3.3 MMHG
BH CV ECHO MEAS - MV MEAN PG: 1.4 MMHG
BH CV ECHO MEAS - MV P1/2T MAX VEL: 84.2 CM/SEC
BH CV ECHO MEAS - MV P1/2T: 58.2 MSEC
BH CV ECHO MEAS - MV V2 MAX: 90.9 CM/SEC
BH CV ECHO MEAS - MV V2 MEAN: 55.5 CM/SEC
BH CV ECHO MEAS - MV V2 VTI: 23.9 CM
BH CV ECHO MEAS - MVA P1/2T LCG: 2.6 CM^2
BH CV ECHO MEAS - MVA(P1/2T): 3.8 CM^2
BH CV ECHO MEAS - MVA(VTI): 2.6 CM^2
BH CV ECHO MEAS - PA MAX PG (FULL): 2.5 MMHG
BH CV ECHO MEAS - PA MAX PG: 5.6 MMHG
BH CV ECHO MEAS - PA V2 MAX: 118.1 CM/SEC
BH CV ECHO MEAS - PVA(V,A): 2.8 CM^2
BH CV ECHO MEAS - PVA(V,D): 2.8 CM^2
BH CV ECHO MEAS - QP/QS: 1.1
BH CV ECHO MEAS - RV MAX PG: 3.1 MMHG
BH CV ECHO MEAS - RV MEAN PG: 1.7 MMHG
BH CV ECHO MEAS - RV V1 MAX: 88.3 CM/SEC
BH CV ECHO MEAS - RV V1 MEAN: 60.1 CM/SEC
BH CV ECHO MEAS - RV V1 VTI: 18.9 CM
BH CV ECHO MEAS - RVOT AREA: 3.8 CM^2
BH CV ECHO MEAS - RVOT DIAM: 2.2 CM
BH CV ECHO MEAS - SI(CUBED): 24.2 ML/M^2
BH CV ECHO MEAS - SI(LVOT): 23.9 ML/M^2
BH CV ECHO MEAS - SI(MOD-SP2): 16.1 ML/M^2
BH CV ECHO MEAS - SI(MOD-SP4): 21.5 ML/M^2
BH CV ECHO MEAS - SI(TEICH): 21.3 ML/M^2
BH CV ECHO MEAS - SV(CUBED): 63 ML
BH CV ECHO MEAS - SV(LVOT): 62.2 ML
BH CV ECHO MEAS - SV(MOD-SP2): 42 ML
BH CV ECHO MEAS - SV(MOD-SP4): 56 ML
BH CV ECHO MEAS - SV(RVOT): 71.1 ML
BH CV ECHO MEAS - SV(TEICH): 55.6 ML
BH CV ECHO MEAS - TAPSE (>1.6): 3.1 CM2
BH CV ECHO MEAS - TR MAX VEL: 232.1 CM/SEC
BH CV ECHO MEASUREMENTS AVERAGE E/E' RATIO: 8.11
BH CV XLRA - RV BASE: 3.5 CM
BH CV XLRA - RV LENGTH: 6.4 CM
BH CV XLRA - RV MID: 3 CM
BH CV XLRA - TDI S': 13.8 CM/SEC
BUN SERPL-MCNC: 10 MG/DL (ref 8–23)
BUN/CREAT SERPL: 11.6 (ref 7–25)
CALCIUM SPEC-SCNC: 8.9 MG/DL (ref 8.6–10.5)
CHLORIDE SERPL-SCNC: 100 MMOL/L (ref 98–107)
CO2 SERPL-SCNC: 25.1 MMOL/L (ref 22–29)
CREAT SERPL-MCNC: 0.86 MG/DL (ref 0.76–1.27)
GFR SERPL CREATININE-BSD FRML MDRD: 91 ML/MIN/1.73
GLUCOSE BLDC GLUCOMTR-MCNC: 106 MG/DL (ref 70–130)
GLUCOSE BLDC GLUCOMTR-MCNC: 113 MG/DL (ref 70–130)
GLUCOSE SERPL-MCNC: 111 MG/DL (ref 65–99)
LEFT ATRIUM VOLUME INDEX: 11.7 ML/M2
POTASSIUM SERPL-SCNC: 4.1 MMOL/L (ref 3.5–5.2)
SODIUM SERPL-SCNC: 136 MMOL/L (ref 136–145)

## 2020-08-08 PROCEDURE — G0378 HOSPITAL OBSERVATION PER HR: HCPCS

## 2020-08-08 PROCEDURE — 80048 BASIC METABOLIC PNL TOTAL CA: CPT | Performed by: INTERNAL MEDICINE

## 2020-08-08 PROCEDURE — 99214 OFFICE O/P EST MOD 30 MIN: CPT | Performed by: INTERNAL MEDICINE

## 2020-08-08 PROCEDURE — 82962 GLUCOSE BLOOD TEST: CPT

## 2020-08-08 PROCEDURE — 25010000002 EPTIFIBATIDE PER 5 MG: Performed by: INTERNAL MEDICINE

## 2020-08-08 PROCEDURE — 93306 TTE W/DOPPLER COMPLETE: CPT | Performed by: INTERNAL MEDICINE

## 2020-08-08 PROCEDURE — 93306 TTE W/DOPPLER COMPLETE: CPT

## 2020-08-08 PROCEDURE — 93005 ELECTROCARDIOGRAM TRACING: CPT | Performed by: INTERNAL MEDICINE

## 2020-08-08 PROCEDURE — 25010000002 PERFLUTREN (DEFINITY) 8.476 MG IN SODIUM CHLORIDE 0.9 % 10 ML INJECTION: Performed by: INTERNAL MEDICINE

## 2020-08-08 RX ORDER — LISINOPRIL 20 MG/1
20 TABLET ORAL EVERY 12 HOURS SCHEDULED
Qty: 180 TABLET | Refills: 3 | Status: SHIPPED | OUTPATIENT
Start: 2020-08-08

## 2020-08-08 RX ORDER — LISINOPRIL 20 MG/1
20 TABLET ORAL EVERY 12 HOURS SCHEDULED
Status: DISCONTINUED | OUTPATIENT
Start: 2020-08-08 | End: 2020-08-08 | Stop reason: HOSPADM

## 2020-08-08 RX ORDER — ASPIRIN 81 MG/1
81 TABLET ORAL DAILY
Qty: 90 TABLET | Refills: 3 | Status: SHIPPED | OUTPATIENT
Start: 2020-08-09

## 2020-08-08 RX ORDER — HYDROCHLOROTHIAZIDE 12.5 MG/1
12.5 CAPSULE, GELATIN COATED ORAL DAILY
Qty: 90 CAPSULE | Refills: 3 | Status: SHIPPED | OUTPATIENT
Start: 2020-08-09

## 2020-08-08 RX ORDER — CLOPIDOGREL BISULFATE 75 MG/1
75 TABLET ORAL DAILY
Qty: 90 TABLET | Refills: 3 | Status: SHIPPED | OUTPATIENT
Start: 2020-08-09

## 2020-08-08 RX ORDER — ATORVASTATIN CALCIUM 40 MG/1
40 TABLET, FILM COATED ORAL NIGHTLY
Qty: 90 TABLET | Refills: 3 | Status: SHIPPED | OUTPATIENT
Start: 2020-08-08

## 2020-08-08 RX ADMIN — PERFLUTREN 3 ML: 6.52 INJECTION, SUSPENSION INTRAVENOUS at 08:15

## 2020-08-08 RX ADMIN — CLOPIDOGREL 75 MG: 75 TABLET, FILM COATED ORAL at 09:53

## 2020-08-08 RX ADMIN — ASPIRIN 81 MG: 81 TABLET, COATED ORAL at 09:53

## 2020-08-08 RX ADMIN — FLUTICASONE PROPIONATE 1 SPRAY: 50 SPRAY, METERED NASAL at 09:54

## 2020-08-08 RX ADMIN — EPTIFIBATIDE 2 MCG/KG/MIN: 0.75 INJECTION INTRAVENOUS at 00:32

## 2020-08-08 RX ADMIN — LISINOPRIL 20 MG: 20 TABLET ORAL at 09:53

## 2020-08-08 RX ADMIN — HYDROCHLOROTHIAZIDE 12.5 MG: 12.5 CAPSULE ORAL at 09:53

## 2020-08-08 RX ADMIN — PREGABALIN 100 MG: 100 CAPSULE ORAL at 09:53

## 2020-08-08 RX ADMIN — DULOXETINE HYDROCHLORIDE 60 MG: 60 CAPSULE, DELAYED RELEASE ORAL at 09:53

## 2020-08-08 NOTE — DISCHARGE SUMMARY
Date of Discharge:  8/8/2020  Date of Admit: 8/6/2020    Discharge Diagnosis:  Chest pain    Hypertension    Type 2 diabetes mellitus (CMS/[facility])    BMI 40.0-44.9, adult (CMS/HCC)    NSTEMI, initial episode of care ([facility])      Hospital Course:     Patient came in with chest pain and was diagnosed with a non-ST elevation myocardial infarction.  He underwent cardiac catheterization done 8/7/2020 showing normal left main, 20-30% mid segment LAD stenosis, 30% obtuse marginal stenosis with a 70% branch stenosis in a small branch but patent circumflex, large dominant RCA with 95% proximal stenosis which was stented with a 4 x 28 mm Riya drug-eluting stent.  He is done well since then is chest pain-free.  His inferior wall did look a little hypokinetic on the cath.    Echo done 8/8/2020 showed ejection fraction 60 to 65% with no wall motion abnormalities, left ventricular hypertrophy which was mild to moderate and grade 1 diastolic dysfunction.    He has no chest pain or pressure.  He has no difficulty breathing.  He has no tachycardia, dizziness or syncope.  He is tolerating his medications well.    Procedures Performed  Procedure(s):  Left Heart Cath  Left ventriculography  Coronary angiography  Stent MADHU coronary       Consults     Date and Time Order Name Status Description    8/6/2020 0408 Inpatient Cardiology Consult Completed     8/6/2020 2211 [facility] (on-call MD unless specified) Details Completed     8/6/2020 221 [facility] (on-call MD unless specified) Completed           Pertinent Test Results:  .      Discharge Physical Exam:    General Appearance No acute distress   Neck No adenopathy, supple, trachea midline, no thyromegaly, no carotid bruit, no JVD   Lungs Clear to auscultation,respirations regular, even and unlabored   Heart Regular rhythm and normal rate, normal S1 and S2, no murmur, no gallop, no rub, no click   Chest wall No abnormalities observed   Abdomen Normal bowel sounds, no masses, no hepatomegaly,  soft   Extremities Moves all extremities well, no edema, no cyanosis, no redness   Neurological Alert and oriented x 3     Discharge Medications     Discharge Medications      New Medications      Instructions Start Date   aspirin 81 MG EC tablet   81 mg, Oral, Daily   Start Date:  August 9, 2020     atorvastatin 40 MG tablet  Commonly known as:  LIPITOR   40 mg, Oral, Nightly      clopidogrel 75 MG tablet  Commonly known as:  PLAVIX   75 mg, Oral, Daily   Start Date:  August 9, 2020     hydroCHLOROthiazide 12.5 MG capsule  Commonly known as:  MICROZIDE  Replaces:  hydroCHLOROthiazide 12.5 MG tablet   12.5 mg, Oral, Daily   Start Date:  August 9, 2020        Changes to Medications      Instructions Start Date   lisinopril 20 MG tablet  Commonly known as:  PRINIVILZESTRIL  What changed:    · medication strength  · how much to take  · when to take this   20 mg, Oral, Every 12 Hours Scheduled         Continue These Medications      Instructions Start Date   diclofenac 75 MG EC tablet  Commonly known as:  VOLTAREN   75 mg, Oral, 2 Times Daily      DULoxetine 60 MG capsule  Commonly known as:  CYMBALTA   TK 1 C PO BID      fluticasone 50 MCG/ACT nasal spray  Commonly known as:  FLONASE   1 spray, Nasal      glucose blood test strip   Use as instructed and test blood sugar two times daily      glucose monitor monitoring kit   Use to check blood sugar two times daily      ipratropium 0.06 % nasal spray  Commonly known as:  ATROVENT   2 sprays, Nasal, 4 Times Daily      Lancets misc   Check blood sugar two times daily      metFORMIN  MG 24 hr tablet  Commonly known as:  GLUCOPHAGE-XR   500 mg, Oral, Daily With Breakfast      metoprolol succinate  MG 24 hr tablet  Commonly known as:  TOPROL-XL   100 mg, Oral, Daily      Ozempic (0.25 or 0.5 MG/DOSE) 2 MG/1.5ML solution pen-injector  Generic drug:  Semaglutide(0.25 or 0.5MG/DOS)   INJECT 0.25 MG WEEKLY FOR 4 WEEKS THEN INCREASE TO 0.5 MG WEEKLY THEREAFTER       pregabalin 100 MG capsule  Commonly known as:  LYRICA   TAKE ONE CAPSULE BY MOUTH THREE TIMES DAILY      Testosterone Cypionate 200 MG/ML injection  Commonly known as:  Depo-Testosterone   200 mg, Intramuscular, Every 14 Days      vardenafil 20 MG tablet  Commonly known as:  Levitra   20 mg, Oral, Daily PRN         Stop These Medications    cefdinir 300 MG capsule  Commonly known as:  OMNICEF     hydroCHLOROthiazide 12.5 MG tablet  Commonly known as:  HYDRODIURIL  Replaced by:  hydroCHLOROthiazide 12.5 MG capsule     lisinopril-hydrochlorothiazide 10-12.5 MG per tablet  Commonly known as:  PRINZIDE,ZESTORETIC            Discharge Diet:     Activity at Discharge:     Discharge disposition: home    Condition on Discharge: stable    Follow-up Appointments  No future appointments.      Test Results Pending at Discharge     See Linh in 2 weeks.     Geni Khoury MD  08/08/20  11:38    32min spent in reviewing records, discussion and examination of the patient and discussion with other members of the patient's medical team.     Dictated utilizing Dragon dictation

## 2020-08-08 NOTE — OUTREACH NOTE
Prep Survey      Responses   [facility] discharged from?  [facility]   Is LACE score < 7 ?  Yes   Eligibility  [facility]   Date of Admission  08/06/20   Date of Discharge  08/08/20   Discharge Disposition  Home or Self Care   Discharge diagnosis  NSTEMI,   heart cath   COVID-19 Test Status  Negative   Does the patient have one of the following disease processes/diagnoses(primary or secondary)?  Acute MI (STEMI,NSTEMI)   Does the patient have Home health ordered?  No   Is there a DME ordered?  No   Prep survey completed?  Yes          Kathie Mojica RN

## 2020-08-10 ENCOUNTER — TRANSITIONAL CARE MANAGEMENT TELEPHONE ENCOUNTER (OUTPATIENT)
Dept: CALL CENTER | Facility: HOSPITAL | Age: 60
End: 2020-08-10

## 2020-08-10 ENCOUNTER — TELEPHONE (OUTPATIENT)
Dept: CARDIAC REHAB | Facility: HOSPITAL | Age: 60
End: 2020-08-10

## 2020-08-10 NOTE — OUTREACH NOTE
Call Center TCM Note      Responses   [facility] patient discharged from?  [place]   Does the patient have one of the following disease processes/diagnoses(primary or secondary)?  Acute MI (STEMI,NSTEMI)   TCM attempt successful?  Yes   Call start time  1318   Call end time  1321   Discharge diagnosis  NSTEMI,   heart cath   Meds reviewed with patient/caregiver?  Yes   Is the patient having any side effects they believe may be caused by any medication additions or changes?  No   Does the patient have all prescriptions related to this admission filled (includes statins,anticoagulants,HTN meds,anti-arrhythmia meds)  Yes   Is the patient taking all medications as directed (includes completed medication regime)?  Yes   Does the patient have a primary care provider?   Yes   Does the patient have an appointment with their PCP,cardiologist,or clinic within 7 days of discharge?  No   Comments regarding PCP  Stan Calderon MD   Comments  Pt needs early a.m. appt due to night work, and no availabiltiy I could access. Will have [facility] review sxhed and call pt. Pt goes to [facility]   Has home health visited the patient within 72 hours of discharge?  N/A   Nursing interventions  Reviewed instructions with patient   What is the patient's perception of their health status since discharge?  Improving   Nursing interventions  Nurse provided patient education   Is the patient/caregiver able to teach back signs and symptoms of when to call for help immediately:  Sudden chest discomfort, Nausea or vomiting, Dizziness or lightheadedness, Sudden discomfort in arms, back, neck or jaw, Shortness of breath at any time, Irregular or rapid heart rate, Sudden sweating or clammy skin   Is the pateint /caregiver able to teach back the importance of cardiac rehab?  Yes   Is the patient/caregiver able to teach back lifestyle changes to help prevent MIs  Reducing stress, Regular exercise as approved by provider, Limiting alcohol intake,  Heart healthy diet, Maintaining a healthy weight, Managing diabetes   Is the patient/caregiver able to teach back ways to prevent a second heart attack:  Take medications, Follow up with MD, Participate in Cardiac Rehab, Manage risk factors, Get support (AHA website:supportnetwork.heart.org)   Is the patient/caregiver able to teach back the hierarchy of who to call/visit for symptoms/problems? PCP, Specialist, Home health nurse, Urgent Care, ED, 911  Yes   TCM call completed?  Yes   Wrap up additional comments  Pt feels very well s/p non-ST MI with cardiac cath and stenting. Pt states no chest pain, SOB, nausea. No discomfort from cath or stent procedure. Pt does want TCM fwp with Dr Calderon. I will have ofc review sched and call pt.           Shira Souza MA    8/10/2020, 13:24

## 2020-08-10 NOTE — TELEPHONE ENCOUNTER
Received order for cardiac rehab and called patient to set up 1st appointment.  Pt & wife both on phone.  I provided information about the logistics and benefits of the program.   They have concerns about cost of program and insurance coverage.  I told them we would call insurance prior to 1st appointment and encouraged them to call insurance as well.  They requested that I send them written information about the program and plan to discuss attending the program with their primary doctor and cardiologist.  I am mailing out the requested information.  They will call us back if/when they are ready to schedule an appointment.  Thank you for the referral!

## 2020-08-17 ENCOUNTER — OFFICE VISIT (OUTPATIENT)
Dept: FAMILY MEDICINE CLINIC | Facility: CLINIC | Age: 60
End: 2020-08-17

## 2020-08-17 VITALS
SYSTOLIC BLOOD PRESSURE: 122 MMHG | DIASTOLIC BLOOD PRESSURE: 74 MMHG | HEIGHT: 72 IN | WEIGHT: 315 LBS | OXYGEN SATURATION: 98 % | TEMPERATURE: 98.4 F | BODY MASS INDEX: 42.66 KG/M2 | HEART RATE: 74 BPM

## 2020-08-17 DIAGNOSIS — I25.10 CAD S/P PERCUTANEOUS CORONARY ANGIOPLASTY: ICD-10-CM

## 2020-08-17 DIAGNOSIS — I10 ESSENTIAL HYPERTENSION: ICD-10-CM

## 2020-08-17 DIAGNOSIS — E11.40 TYPE 2 DIABETES MELLITUS WITH DIABETIC NEUROPATHY, WITHOUT LONG-TERM CURRENT USE OF INSULIN (HCC): ICD-10-CM

## 2020-08-17 DIAGNOSIS — Z09 HOSPITAL DISCHARGE FOLLOW-UP: Primary | ICD-10-CM

## 2020-08-17 DIAGNOSIS — Z98.61 CAD S/P PERCUTANEOUS CORONARY ANGIOPLASTY: ICD-10-CM

## 2020-08-17 PROCEDURE — 99495 TRANSJ CARE MGMT MOD F2F 14D: CPT | Performed by: INTERNAL MEDICINE

## 2020-08-17 NOTE — PROGRESS NOTES
Subjective   Eduardo Ramires is a 60 y.o. male.     Chief Complaint   Patient presents with   • heart attack     08/11       History of Present Illness   Within 48 business hours after discharge our office contacted him via telephone to coordinate his care and needs.  Date of TCM Phone Call 8/8/2020   [facility]   Date of Admission 8/6/2020   Date of Discharge 8/8/2020   Discharge Disposition Home or Self Care     Risk for Readmission (LACE) Score: 6 (8/8/2020  6:00 AM)      Patient was admitted to [facility]   ALL records were obtained and reviewed and /or discussed with admitting physician  Date of admission 8/6/2020  Date of discharge 8/8/2020  Diagnosis Chest pain    Hypertension    Type 2 diabetes mellitus     BMI 40.0-44.9, adult     NSTEMI, initial episode of care     Hospital Course   Patient came in with chest pain and was diagnosed with a non-ST elevation myocardial infarction.  He underwent cardiac catheterization done 8/7/2020 showing normal left main, 20-30% mid segment LAD stenosis, 30% obtuse marginal stenosis with a 70% branch stenosis in a small branch but patent circumflex, large dominant RCA with 95% proximal stenosis which was stented with a 4 x 28 mm Riya drug-eluting stent.  He is done well since then is chest pain-free.  His inferior wall did look a little hypokinetic on the cath.     Echo done 8/8/2020 showed ejection fraction 60 to 65% with no wall motion abnormalities, left ventricular hypertrophy which was mild to moderate and grade 1 diastolic dysfunction.     He has no chest pain or pressure.  He has no difficulty breathing.  He has no tachycardia, dizziness or syncope.  He is tolerating his medications well.     Procedures Performed  Left Heart Cath  Left ventriculography  Coronary angiography  Stent MADHU coronary    Medications upon discharge added aspirin 81 mg daily, atorvastatin 40 mg daily, Plavix 75 mg daily, changed lisinopril HCTZ 10  per 12.5 to lisinopril 20 mg BID and hydrochlorothiazide 12.5 mg daily.  Continued the diclofenac 75 twice daily, duloxetine 60 twice daily, Flonase, metformin, metoprolol  daily, Ozempic, Lyrica 100 3 times daily, testosterone 200 every 2 weeks, Levitra as needed  Disposition Home  Follow up cardiology Dr. Khoury  Currently c/o fatigue only that is mild.  Denies CP, SOA, palpitations.  Home BS running 107-242  Condition stable    I reviewed and requested records labs and diagnostics from the hospital with the patient and family.  The patient is to follow-up with specialist as discussed and directed.  If any problems arise or further questions develop patient is to call or to contact us for any needs.    The following portions of the patient's history were reviewed and updated as appropriate: allergies, current medications, past family history, past medical history, past social history, past surgical history and problem list.    Past Medical History:   Diagnosis Date   • Allergic rhinitis 8/26/2019   • Anal fistula 8/26/2019   • Arthritis 8/26/2019   • BMI 45.0-49.9, adult (CMS/[facility])    • BPH (benign prostatic hyperplasia) 8/26/2019   • Degeneration, intervertebral disc, thoracic 8/26/2019   • Degenerative joint disease 8/26/2019   • Degenerative joint disease of left hip    • Diabetic polyneuropathy (CMS/HCC) 8/26/2019   • Elevated blood sugar    • Erectile dysfunction 8/26/2019   • High blood pressure    • History of kidney stones    • Hypertension 8/26/2019   • Hypogonadism male 8/26/2019   • Left hip pain    • Nephrolithiasis    • Peripheral neuropathy 8/26/2019   • Preoperative clearance    • Refused influenza vaccine    • Right knee pain 8/26/2019   • Synovial cyst popliteal space    • Thoracic back pain 8/26/2019   • Type 2 diabetes mellitus (CMS/HCC) 8/26/2019       Past Surgical History:   Procedure Laterality Date   • BACK SURGERY      Lower Back Surgery   • CARDIAC CATHETERIZATION N/A 8/7/2020     Procedure: Left Heart Cath;  Surgeon: Dallas Shelton MD;  Location:  [facility] C[facility]VASIVE [facility];  Service: Cardiology;  Laterality: N/A;  CORONARY ANGIOGRAPHY, ELEVATED TROPONIN, CHEST PAIN     • CARDIAC CATHETERIZATION N/A 8/7/2020    Procedure: Left ventriculography;  Surgeon: Dallas Shelton MD;  Location:  [facility];  Service: Cardiology;  Laterality: N/A;   • CARDIAC CATHETERIZATION N/A 8/7/2020    Procedure: Coronary angiography;  Surgeon: Dallas Shelton MD;  Location:  [facility]VAS[facility];  Service: Cardiology;  Laterality: N/A;   • CARDIAC CATHETERIZATION N/A 8/7/2020    Procedure: Stent MADHU coronary;  Surgeon: Dallas Shelton MD;  Location: [facility];  Service: Cardiology;  Laterality: N/A;   • COLONOSCOPY  2010    normal   • KIDNEY STONE SURGERY  2016    laser stone surgery   • LUMBAR SPINE SURGERY  1989   • SHOULDER ROTATOR CUFF REPAIR Left 2012   • TOTAL HIP ARTHROPLASTY Left 12/10/2018    Dr Barraza       Family History   Problem Relation Age of Onset   • Valvular heart disease Mother    • Arthritis Father    • Hypertension Father    • Diabetes type II Father    • No Known Problems Other        Social History     Socioeconomic History   • Marital status:      Spouse name: Not on file   • Number of children: Not on file   • Years of education: Not on file   • Highest education level: Not on file   Tobacco Use   • Smoking status: Former Smoker   • Smokeless tobacco: Never Used   • Tobacco comment: smoked less than 1 pack per day for 15 years   Substance and Sexual Activity   • Alcohol use: Yes     Comment: occasionally-7 or less drinks per week   • Drug use: Yes     Types: Marijuana       Current Outpatient Medications   Medication Sig Dispense Refill   • aspirin (aspirin) 81 MG EC tablet Take 1 tablet by mouth Daily. 90 tablet 3   • atorvastatin (LIPITOR) 40 MG tablet Take 1 tablet by mouth Every Night. 90 tablet 3   •  clopidogrel (PLAVIX) 75 MG tablet Take 1 tablet by mouth Daily. 90 tablet 3   • diclofenac (VOLTAREN) 75 MG EC tablet Take 1 tablet by mouth 2 (Two) Times a Day. 180 tablet 1   • DULoxetine (CYMBALTA) 60 MG capsule TK 1 C PO BID  5   • fluticasone (FLONASE) 50 MCG/ACT nasal spray 1 spray into the nostril(s) as directed by provider.     • glucose blood test strip Use as instructed and test blood sugar two times daily 200 each 12   • glucose monitor monitoring kit Use to check blood sugar two times daily 1 each 0   • hydroCHLOROthiazide (MICROZIDE) 12.5 MG capsule Take 1 capsule by mouth Daily. 90 capsule 3   • ipratropium (ATROVENT) 0.06 % nasal spray 2 sprays into the nostril(s) as directed by provider 4 (Four) Times a Day. 1 each 12   • Lancets misc Check blood sugar two times daily 200 each 10   • lisinopril (PRINIVIL,ZESTRIL) 20 MG tablet Take 1 tablet by mouth Every 12 (Twelve) Hours. 180 tablet 3   • metFORMIN ER (GLUCOPHAGE-XR) 500 MG 24 hr tablet TAKE 1 TABLET BY MOUTH DAILY WITH BREAKFAST 90 tablet 1   • metoprolol succinate XL (TOPROL-XL) 100 MG 24 hr tablet Take 1 tablet by mouth Daily. 90 tablet 1   • pregabalin (LYRICA) 100 MG capsule TAKE ONE CAPSULE BY MOUTH THREE TIMES DAILY 90 capsule 2   • Semaglutide,0.25 or 0.5MG/DOS, (Ozempic, 0.25 or 0.5 MG/DOSE,) 2 MG/1.5ML solution pen-injector Inject 0.5 mg under the skin into the appropriate area as directed 1 (One) Time Per Week. 1.5 mL 3   • Testosterone Cypionate (DEPO-TESTOSTERONE) 200 MG/ML injection Inject 1 mL into the appropriate muscle as directed by prescriber Every 14 (Fourteen) Days. 2 mL 4   • vardenafil (Levitra) 20 MG tablet Take 1 tablet by mouth Daily As Needed for Erectile Dysfunction. 10 tablet 3     No current facility-administered medications for this visit.        Review of Systems   Constitutional: Negative for activity change, appetite change, fatigue, fever, unexpected weight gain and unexpected weight loss.   HENT: Negative for  nosebleeds, rhinorrhea, trouble swallowing and voice change.    Eyes: Negative for visual disturbance.   Respiratory: Negative for cough, chest tightness, shortness of breath and wheezing.    Cardiovascular: Negative for chest pain, palpitations and leg swelling.   Gastrointestinal: Negative for abdominal pain, blood in stool, constipation, diarrhea, nausea, vomiting, GERD and indigestion.   Genitourinary: Negative for dysuria, frequency and hematuria.   Musculoskeletal: Negative for arthralgias, back pain and myalgias.   Skin: Negative for rash and bruise.   Neurological: Negative for dizziness, tremors, weakness, light-headedness, numbness, headache and memory problem.   Hematological: Negative for adenopathy. Does not bruise/bleed easily.   Psychiatric/Behavioral: Negative for sleep disturbance and depressed mood. The patient is not nervous/anxious.        Objective   Vitals:    08/17/20 0826   BP: 122/74   Pulse: 74   Temp: 98.4 °F (36.9 °C)   SpO2: 98%     Body mass index is 43.39 kg/m².  Physical Exam   Constitutional: He is oriented to person, place, and time. He appears well-developed and well-nourished. No distress.   HENT:   Head: Normocephalic and atraumatic.   Right Ear: External ear normal.   Left Ear: External ear normal.   Nose: Nose normal.   Mouth/Throat: Oropharynx is clear and moist.   Eyes: Pupils are equal, round, and reactive to light. Conjunctivae and EOM are normal.   Neck: Normal range of motion. Neck supple. No tracheal deviation present. No thyromegaly present.   Cardiovascular: Normal rate, regular rhythm, normal heart sounds and intact distal pulses. Exam reveals no gallop and no friction rub.   No murmur heard.  Pulmonary/Chest: Effort normal and breath sounds normal. No respiratory distress.   Abdominal: Soft. Bowel sounds are normal. He exhibits no mass. There is no tenderness. There is no guarding.   Musculoskeletal: Normal range of motion. He exhibits no edema.   Lymphadenopathy:      He has no cervical adenopathy.   Neurological: He is alert and oriented to person, place, and time. He displays normal reflexes. He exhibits normal muscle tone.   Skin: Skin is warm and dry. Capillary refill takes less than 2 seconds. No rash noted. He is not diaphoretic.   Psychiatric: He has a normal mood and affect. His behavior is normal. Judgment and thought content normal.   Nursing note and vitals reviewed.        Assessment/Plan   Eduardo was seen today for heart attack.    Diagnoses and all orders for this visit:    Hospital discharge follow-up    CAD S/P percutaneous coronary angioplasty    Essential hypertension    Type 2 diabetes mellitus with diabetic neuropathy, without long-term current use of insulin (CMS/[facility])  -     Semaglutide,0.25 or 0.5MG/DOS, (Ozempic, 0.25 or 0.5 MG/DOSE,) 2 MG/1.5ML solution pen-injector; Inject 0.5 mg under the skin into the appropriate area as directed 1 (One) Time Per Week.    Chart reviewed and discussed with patient.  Will continue the current medications unchanged.  Discussed diet, weight loss, diabetic care and monitoring.  No new changes at this time.  Follow up with cardiology on 8/24/2020 and will likely be starting cardiac rehab.

## 2020-08-26 ENCOUNTER — OFFICE VISIT (OUTPATIENT)
Dept: CARDIOLOGY | Facility: CLINIC | Age: 60
End: 2020-08-26

## 2020-08-26 VITALS
SYSTOLIC BLOOD PRESSURE: 128 MMHG | WEIGHT: 315 LBS | DIASTOLIC BLOOD PRESSURE: 70 MMHG | BODY MASS INDEX: 42.66 KG/M2 | HEIGHT: 72 IN | HEART RATE: 65 BPM

## 2020-08-26 DIAGNOSIS — I21.4 NSTEMI, INITIAL EPISODE OF CARE (HCC): ICD-10-CM

## 2020-08-26 DIAGNOSIS — I10 ESSENTIAL HYPERTENSION: ICD-10-CM

## 2020-08-26 DIAGNOSIS — I25.10 CAD S/P PERCUTANEOUS CORONARY ANGIOPLASTY: ICD-10-CM

## 2020-08-26 DIAGNOSIS — Z09 HOSPITAL DISCHARGE FOLLOW-UP: Primary | ICD-10-CM

## 2020-08-26 DIAGNOSIS — E78.2 MIXED HYPERLIPIDEMIA: ICD-10-CM

## 2020-08-26 DIAGNOSIS — Z98.61 CAD S/P PERCUTANEOUS CORONARY ANGIOPLASTY: ICD-10-CM

## 2020-08-26 DIAGNOSIS — E11.49 TYPE 2 DIABETES MELLITUS WITH OTHER NEUROLOGIC COMPLICATION, WITHOUT LONG-TERM CURRENT USE OF INSULIN (HCC): ICD-10-CM

## 2020-08-26 DIAGNOSIS — E78.1 HYPERTRIGLYCERIDEMIA: ICD-10-CM

## 2020-08-26 PROBLEM — R07.9 CHEST PAIN: Status: RESOLVED | Noted: 2020-08-06 | Resolved: 2020-08-26

## 2020-08-26 PROBLEM — Z11.59 ENCOUNTER FOR HEPATITIS C SCREENING TEST FOR LOW RISK PATIENT: Status: RESOLVED | Noted: 2020-02-20 | Resolved: 2020-08-26

## 2020-08-26 PROCEDURE — 99214 OFFICE O/P EST MOD 30 MIN: CPT | Performed by: NURSE PRACTITIONER

## 2020-08-26 PROCEDURE — 93000 ELECTROCARDIOGRAM COMPLETE: CPT | Performed by: NURSE PRACTITIONER

## 2020-08-26 NOTE — PROGRESS NOTES
Date of Office Visit: 2020  Encounter Provider: KATHRIN Stallings  Place of Service: [facility] CARDIOLOGY  Patient Name: Eduardo Ramires  :1960  Primary Cardiologist: Dr. Geni Khoury     Chief Complaint   Patient presents with   • Coronary Artery Disease   • Follow-up   :     Dear Dr. Stan Calderon,     HPI: Eduardo Ramires is a pleasant 60 y.o. male who presents today for hospital follow up. He is a new patient to me and his previous records have been reviewed.    He has a known history of hypertension, hyperlipidemia, and obesity.  He has bilateral knee problems that have limited his exercise.  He does like to ride a stationary bicycle and swim.  In 2020, he was diagnosed with diabetes mellitus.  He is a former cigarette smoker and quit over 30 years ago.  He is reported a family history of heart disease with mother having CAD, CABG, and valve replacement.    On 2020, he presented to the [facility] emergency department with left-sided sharp chest pain radiating to his shoulder and arm for a few days, shortness of breath, and diaphoresis.  Troponin level was abnormal consistent with a non-STEMI.  On 2020 cardiac catheterization showed normal left main, 20-30% mid segment LAD stenosis, 30% obtuse marginal stenosis, 70% restenosis, patent circumflex, and large dominant RCA with 95% proximal stenosis which received a drug-eluting stent.  He was noted to have inferior wall hypokinesis on cath.  On 2020 echocardiogram showed normal EF of 60-65%, no wall motion abnormalities, mild to moderate left ventricular hypertrophy, and grade 1 diastolic dysfunction.  He was discharged on goal-directed therapy and recommended to follow-up with me in the office.    Today is his follow-up visit.  He feels that he has had a second chance of life and is really trying to modify his risk factors.  He is working closely with his PCP for better diabetes  management.  I reviewed his lipid panel in the hospital which showed total cholesterol 185, triglycerides 252, HDL 26, and .  His blood pressure and heart rate are both normal today.  He experiences some mild dyspnea with exertion and fatigue.  He denies chest pain, palpitations, edema, dizziness, syncope, or bleeding.  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  He is not sure that cardiac rehab will work for him and plans to ride his bicycle.  He also has a right rotator cuff injury and will be having an MRI in the near future.    Past Medical History:   Diagnosis Date   • Allergic rhinitis 8/26/2019   • Anal fistula 8/26/2019   • Arthritis 8/26/2019   • BMI 45.0-49.9, adult (CMS/[facility])    • BPH (benign prostatic hyperplasia) 8/26/2019   • Degeneration, intervertebral disc, thoracic 8/26/2019   • Degenerative joint disease 8/26/2019   • Degenerative joint disease of left hip    • Diabetic polyneuropathy (CMS/[facility]) 8/26/2019   • Elevated blood sugar    • Erectile dysfunction 8/26/2019   • High blood pressure    • History of kidney stones    • Hypertension 8/26/2019   • Hypogonadism male 8/26/2019   • Left hip pain    • Nephrolithiasis    • NSTEMI (non-ST elevated myocardial infarction) (CMS/[facility]) 08/2020   • Peripheral neuropathy 8/26/2019   • Preoperative clearance    • Refused influenza vaccine    • Right knee pain 8/26/2019   • Synovial cyst popliteal space    • Thoracic back pain 8/26/2019   • Type 2 diabetes mellitus (CMS/[facility]) 8/26/2019       Past Surgical History:   Procedure Laterality Date   • BACK SURGERY      Lower Back Surgery   • CARDIAC CATHETERIZATION N/A 8/7/2020    Procedure: Left Heart Cath;  Surgeon: Dallas Shelton MD;  Location: [facility] INVASIVE LOCATION;  Service: Cardiology;  Laterality: N/A;  CORONARY ANGIOGRAPHY, ELEVATED TROPONIN, CHEST PAIN     • CARDIAC CATHETERIZATION N/A 8/7/2020    Procedure: Left ventriculography;  Surgeon: Dallas Shelton MD;  Location: [facility]  INVASIVE LOCATION;  Service: Cardiology;  Laterality: N/A;   • CARDIAC CATHETERIZATION N/A 8/7/2020    Procedure: Coronary angiography;  Surgeon: Dallas Shelton MD;  Location:  [facility] INVASIVE LOCATION;  Service: Cardiology;  Laterality: N/A;   • CARDIAC CATHETERIZATION N/A 8/7/2020    Procedure: Stent MADHU coronary;  Surgeon: Dallas Shelton MD;  Location:  [facility] INVASIVE LOCATION;  Service: Cardiology;  Laterality: N/A;   • COLONOSCOPY  2010    normal   • KIDNEY STONE SURGERY  2016    laser stone surgery   • LUMBAR SPINE SURGERY  1989   • SHOULDER ROTATOR CUFF REPAIR Left 2012   • TOTAL HIP ARTHROPLASTY Left 12/10/2018    Dr Barraza       Social History     Socioeconomic History   • Marital status:      Spouse name: Not on file   • Number of children: Not on file   • Years of education: Not on file   • Highest education level: Not on file   Tobacco Use   • Smoking status: Former Smoker   • Smokeless tobacco: Never Used   • Tobacco comment: smoked less than 1 pack per day for 15 years   Substance and Sexual Activity   • Alcohol use: Yes     Comment: occasionally//Caffeine use: Occ   • Drug use: Yes     Types: Marijuana       Family History   Problem Relation Age of Onset   • Valvular heart disease Mother    • Arthritis Father    • Hypertension Father    • Diabetes type II Father    • No Known Problems Other        The following portion of the patient's history were reviewed and updated as appropriate: past medical history, past surgical history, past social history, past family history, allergies, current medications, and problem list.    Review of Systems   Constitution: Positive for malaise/fatigue and weight loss. Negative for chills, diaphoresis, fever, night sweats and weight gain.   HENT: Positive for ear pain, hearing loss, nosebleeds and sore throat. Negative for tinnitus.    Eyes: Negative for blurred vision, double vision, pain and visual disturbance.   Cardiovascular: Negative  for chest pain, claudication, cyanosis, dyspnea on exertion, irregular heartbeat, leg swelling, near-syncope, orthopnea, palpitations, paroxysmal nocturnal dyspnea and syncope.   Respiratory: Positive for shortness of breath and snoring. Negative for cough, hemoptysis and wheezing.    Endocrine: Negative for cold intolerance, heat intolerance and polyuria.   Hematologic/Lymphatic: Negative for bleeding problem. Does not bruise/bleed easily.   Skin: Positive for itching. Negative for color change, dry skin and flushing.   Musculoskeletal: Positive for joint pain and myalgias. Negative for falls, joint swelling, muscle cramps and muscle weakness.   Gastrointestinal: Negative for abdominal pain, constipation, heartburn, melena, nausea and vomiting.   Genitourinary: Negative for dysuria and hematuria.        Erectile dysfunction   Neurological: Positive for numbness and paresthesias. Negative for excessive daytime sleepiness, dizziness, light-headedness, loss of balance, seizures and vertigo.   Psychiatric/Behavioral: Negative for altered mental status, depression, memory loss and substance abuse. The patient does not have insomnia and is not nervous/anxious.    Allergic/Immunologic: Negative for environmental allergies.       No Known Allergies      Current Outpatient Medications:   •  aspirin (aspirin) 81 MG EC tablet, Take 1 tablet by mouth Daily., Disp: 90 tablet, Rfl: 3  •  atorvastatin (LIPITOR) 40 MG tablet, Take 1 tablet by mouth Every Night., Disp: 90 tablet, Rfl: 3  •  clopidogrel (PLAVIX) 75 MG tablet, Take 1 tablet by mouth Daily., Disp: 90 tablet, Rfl: 3  •  diclofenac (VOLTAREN) 75 MG EC tablet, Take 1 tablet by mouth 2 (Two) Times a Day., Disp: 180 tablet, Rfl: 1  •  DULoxetine (CYMBALTA) 60 MG capsule, TK 1 C PO BID, Disp: , Rfl: 5  •  fluticasone (FLONASE) 50 MCG/ACT nasal spray, 1 spray into the nostril(s) as directed by provider., Disp: , Rfl:   •  glucose blood test strip, Use as instructed and test  "blood sugar two times daily, Disp: 200 each, Rfl: 12  •  glucose monitor monitoring kit, Use to check blood sugar two times daily, Disp: 1 each, Rfl: 0  •  hydroCHLOROthiazide (MICROZIDE) 12.5 MG capsule, Take 1 capsule by mouth Daily., Disp: 90 capsule, Rfl: 3  •  ipratropium (ATROVENT) 0.06 % nasal spray, 2 sprays into the nostril(s) as directed by provider 4 (Four) Times a Day., Disp: 1 each, Rfl: 12  •  Lancets misc, Check blood sugar two times daily, Disp: 200 each, Rfl: 10  •  lisinopril (PRINIVIL,ZESTRIL) 20 MG tablet, Take 1 tablet by mouth Every 12 (Twelve) Hours., Disp: 180 tablet, Rfl: 3  •  metFORMIN ER (GLUCOPHAGE-XR) 500 MG 24 hr tablet, TAKE 1 TABLET BY MOUTH DAILY WITH BREAKFAST, Disp: 90 tablet, Rfl: 1  •  metoprolol succinate XL (TOPROL-XL) 100 MG 24 hr tablet, Take 1 tablet by mouth Daily., Disp: 90 tablet, Rfl: 1  •  pregabalin (LYRICA) 100 MG capsule, TAKE ONE CAPSULE BY MOUTH THREE TIMES DAILY, Disp: 90 capsule, Rfl: 2  •  Semaglutide,0.25 or 0.5MG/DOS, (Ozempic, 0.25 or 0.5 MG/DOSE,) 2 MG/1.5ML solution pen-injector, Inject 0.5 mg under the skin into the appropriate area as directed 1 (One) Time Per Week., Disp: 1.5 mL, Rfl: 3  •  Testosterone Cypionate (DEPO-TESTOSTERONE) 200 MG/ML injection, Inject 1 mL into the appropriate muscle as directed by prescriber Every 14 (Fourteen) Days., Disp: 2 mL, Rfl: 4  •  vardenafil (Levitra) 20 MG tablet, Take 1 tablet by mouth Daily As Needed for Erectile Dysfunction., Disp: 10 tablet, Rfl: 3        Objective:     Vitals:    08/26/20 0809 08/26/20 0811   BP: 120/70 128/70   BP Location: Left arm Right arm   Pulse: 65    Weight: (!) 145 kg (319 lb 12.8 oz)    Height: 182.9 cm (72\")      Body mass index is 43.37 kg/m².    PHYSICAL EXAM:    Vitals Reviewed.   General Appearance: No acute distress, well developed and well nourished. Obese.   Eyes: Conjunctiva and lids: No erythema, swelling, or discharge. Sclera non-icteric.   HENT: Atraumatic, normocephalic. " External eyes, ears, and nose normal. No hearing loss noted. Mucous membranes normal. Lips not cyanotic. Neck supple with no tenderness.  Respiratory: No signs of respiratory distress. Respiration rhythm and depth normal.   Clear to auscultation. No rales, crackles, rhonchi, or wheezing auscultated.   Cardiovascular:  Jugular Venous Pressure: Normal  Heart Rate and Rhythm: Normal, Heart Sounds: Normal S1 and S2. No S3 or S4 noted.  Murmurs: No murmurs noted. No rubs, thrills, or gallops.   Lower Extremities: No edema noted.  Gastrointestinal:  Abdomen soft, non-distended, non-tender. Normal bowel sounds. No hepatomegaly.   Musculoskeletal: Normal movement of extremities  Skin and Nails: General appearance normal. No pallor, cyanosis, diaphoresis. Skin temperature normal. No clubbing of fingernails.   Psychiatric: Patient alert and oriented to person, place, and time. Speech and behavior appropriate. Normal mood and affect.       ECG 12 Lead  Date/Time: 8/26/2020 8:06 AM  Performed by: Linh Andrews APRN  Authorized by: Linh Andrews APRN   Comparison: compared with previous ECG from 8/8/2020  Similar to previous ECG  Comparison to previous ECG: Normal sinus rhythm, LVH  Rhythm: sinus rhythm  Rate: normal  BPM: 65  Conduction: conduction normal  ST Segments: ST segments normal  T inversion: III and aVF  QRS axis: normal  Other findings: left ventricular hypertrophy    Clinical impression: abnormal EKG              Assessment:       Diagnosis Plan   1. Hospital discharge follow-up     2. CAD S/P percutaneous coronary angioplasty  ECG 12 Lead   3. NSTEMI, initial episode of care (CMS/[facility])  ECG 12 Lead   4. Essential hypertension     5. Mixed hyperlipidemia     6. Hypertriglyceridemia     7. Type 2 diabetes mellitus with other neurologic complication, without long-term current use of insulin (CMS/[facility])     8. BMI 40.0-44.9, adult (CMS/H[facility])            Plan:       1.  Hospital Discharge Follow Up: He is doing well  since his hospitalization.    2/30.  Coronary Artery Disease/Inferior Non-STEMI: He was noted to have some inferior wall hypokinesis and has T wave inversions in the inferior leads today which are new.  He is not having any anginal symptoms and I am not concerned about this EKG.  He is status post stent to the RCA.  He is planning on exercising at home with bicycling and swimming.  He is on goal-directed therapy with aspirin, clopidogrel, atorvastatin, and metoprolol.  I have stressed the importance of remaining on dual antiplatelet therapy.    4.  Hypertension: Blood pressure well controlled.    5/6.  Hyperlipidemia/Hypertriglyceridemia: I recommend an LDL goal less than 70 and triglycerides less than 150.  He was just started on atorvastatin is working on better blood sugar management.    7.  Type II Diabetes Mellitus: Recently diagnosed in March 2020 and working with his PCP for better blood sugar control.    8.  Obesity: His BMI is 43.4.  He said he really wants to lose weight and has been successful with the [employer] program in the past.  He does have bad knees so he is unable to walk or jog.  He is going to decline cardiac rehab and uses bicycle and swim at home.    9.  He says he has had an injury to his right shoulder and is going for an MRI in the near future.  We discussed how most likely he cannot have surgical intervention due to his recent drug-eluting stent placement.    10.  Overall I feel that he is doing well from a cardiac standpoint. I recommend follow-up with Dr. Geni Khoury in 3 months, unless otherwise needed sooner.    As always, it has been a pleasure to participate in your patient's care. Thank you.       Sincerely,       KATHRIN Jin  [facility] Cardiology      · COVID-19 Precautions - Patient was compliant in wearing a mask. When I saw the patient, I used appropriate personal protective equipment (PPE) including mask (standard procedure).   Additionally, I used gown, gloves, and eye shield if indicated.  Hand hygiene was completed before and after seeing the patient.  · Dictated utilizing Dragon Dictation

## 2020-09-14 DIAGNOSIS — E29.1 HYPOGONADISM MALE: ICD-10-CM

## 2020-09-15 RX ORDER — TESTOSTERONE CYPIONATE 200 MG/ML
INJECTION, SOLUTION INTRAMUSCULAR
Qty: 2 ML | Refills: 2 | Status: SHIPPED | OUTPATIENT
Start: 2020-09-15

## 2020-10-06 ENCOUNTER — TELEPHONE (OUTPATIENT)
Dept: CARDIOLOGY | Facility: CLINIC | Age: 60
End: 2020-10-06

## 2020-10-06 NOTE — TELEPHONE ENCOUNTER
Patient may proceed with nasal biopsy from a cardiac standpoint.  In August 2020, he received a drug-eluting stent placement to the right coronary artery.  He is on dual antiplatelet therapy with clopidogrel and aspirin and cannot stop either of these medications in preparation for the biopsy.  If we can be of any further assistance, please contact our office.    Dilcia-please notify patient and send a copy of my letter to his ENT (print off under letters).  Thank you

## 2020-10-06 NOTE — TELEPHONE ENCOUNTER
Eduardo called this morning. He is a patient at [facility]. His ENT wants to do a nasal biopsy. He needs a copy of his most recent EKG and a cardiac clearance letter.    Thank you,    Dilcia Boles RN  Triage [facility]

## 2020-10-16 ENCOUNTER — TELEPHONE (OUTPATIENT)
Dept: CARDIOLOGY | Facility: CLINIC | Age: 60
End: 2020-10-16

## 2020-10-16 ENCOUNTER — TRANSCRIBE ORDERS (OUTPATIENT)
Dept: ADMINISTRATIVE | Facility: HOSPITAL | Age: 60
End: 2020-10-16

## 2020-10-16 DIAGNOSIS — D02.3: Primary | ICD-10-CM

## 2020-10-16 NOTE — TELEPHONE ENCOUNTER
[facility] calling stating they never received clearance for patient surgery.    I faxed over last EKG and and clearance letter from Linh.     Fax # [phone]

## 2020-10-23 ENCOUNTER — APPOINTMENT (OUTPATIENT)
Dept: PET IMAGING | Facility: HOSPITAL | Age: 60
End: 2020-10-23

## 2020-10-27 ENCOUNTER — HOSPITAL ENCOUNTER (OUTPATIENT)
Dept: PET IMAGING | Facility: HOSPITAL | Age: 60
Discharge: HOME | End: 2020-10-27

## 2020-10-27 DIAGNOSIS — D02.3: ICD-10-CM

## 2020-10-27 LAB — GLUCOSE BLDC GLUCOMTR-MCNC: 118 MG/DL (ref 70–130)

## 2020-10-27 PROCEDURE — 0 FLUDEOXYGLUCOSE F18 SOLUTION: Performed by: OTOLARYNGOLOGY

## 2020-10-27 PROCEDURE — 82962 GLUCOSE BLOOD TEST: CPT

## 2020-10-27 PROCEDURE — A9552 F18 FDG: HCPCS | Performed by: OTOLARYNGOLOGY

## 2020-10-27 PROCEDURE — 78815 PET IMAGE W/CT SKULL-THIGH: CPT

## 2020-10-27 RX ADMIN — FLUDEOXYGLUCOSE F18 1 DOSE: 300 INJECTION INTRAVENOUS at 13:17

## 2020-10-28 ENCOUNTER — APPOINTMENT (OUTPATIENT)
Dept: OTHER | Facility: HOSPITAL | Age: 60
End: 2020-10-28

## 2020-10-28 DIAGNOSIS — Z09 FOLLOW UP: ICD-10-CM

## 2020-11-25 ENCOUNTER — TELEPHONE (OUTPATIENT)
Dept: CARDIOLOGY | Facility: CLINIC | Age: 60
End: 2020-11-25

## 2020-11-25 ENCOUNTER — TELEPHONE (OUTPATIENT)
Dept: CARDIAC REHAB | Facility: HOSPITAL | Age: 60
End: 2020-11-25

## 2020-11-25 ENCOUNTER — OFFICE VISIT (OUTPATIENT)
Dept: CARDIOLOGY | Facility: CLINIC | Age: 60
End: 2020-11-25

## 2020-11-25 VITALS
HEART RATE: 84 BPM | HEIGHT: 72 IN | WEIGHT: 315 LBS | DIASTOLIC BLOOD PRESSURE: 70 MMHG | BODY MASS INDEX: 42.66 KG/M2 | SYSTOLIC BLOOD PRESSURE: 120 MMHG

## 2020-11-25 DIAGNOSIS — E11.59 TYPE 2 DIABETES MELLITUS WITH OTHER CIRCULATORY COMPLICATION, WITHOUT LONG-TERM CURRENT USE OF INSULIN (HCC): ICD-10-CM

## 2020-11-25 DIAGNOSIS — Z98.61 CAD S/P PERCUTANEOUS CORONARY ANGIOPLASTY: Primary | ICD-10-CM

## 2020-11-25 DIAGNOSIS — I25.10 CAD S/P PERCUTANEOUS CORONARY ANGIOPLASTY: Primary | ICD-10-CM

## 2020-11-25 DIAGNOSIS — E78.2 MIXED HYPERLIPIDEMIA: ICD-10-CM

## 2020-11-25 DIAGNOSIS — I10 ESSENTIAL HYPERTENSION: ICD-10-CM

## 2020-11-25 PROCEDURE — 99214 OFFICE O/P EST MOD 30 MIN: CPT | Performed by: INTERNAL MEDICINE

## 2020-11-25 NOTE — PROGRESS NOTES
Date of Office Visit: 2020  Encounter Provider: Geni Khoury MD  Place of Service: [facility] CARDIOLOGY  Patient Name: Eduardo Ramires  :1960      Patient ID:  Eduardo Ramires is a 60 y.o. male is here for  followup for CAD.         History of Present Illness    He has a known history of hypertension, hyperlipidemia, and obesity.  He has bilateral knee problems that have limited his exercise.  He does like to ride a stationary bicycle and swim.  In 2020, he was diagnosed with diabetes mellitus.  He is a former cigarette smoker and quit over 30 years ago.  He is reported a family history of heart disease with mother having CAD, CABG, and valve replacement.     On 2020, he presented to the [facility] emergency department with left-sided sharp chest pain radiating to his shoulder and arm for a few days, shortness of breath, and diaphoresis.  Troponin level was abnormal consistent with a non-STEMI.   Cardiac catheterization done 2020 showed normal left main, 20 to 30% mid LAD, 70% proximal small obtuse marginal branch, 95% mid RCA stenosis with 30 to 40% PDA.  He received a 4.0 x 28 mm drug-eluting stent to the mid RCA.  On 2020 echocardiogram showed normal EF of 60-65%, no wall motion abnormalities, mild to moderate left ventricular hypertrophy, and grade 1 diastolic dysfunction.  He was discharged on goal-directed therapy     He also has a right rotator cuff injury and had an MRI 10/28/2020.     Labs in 2020 show normal CMP, normal CBC.  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  His oncologist is Dr. Stubbs, his radiation oncologist is Dr. Salgado.  He at first was getting cisplatin and now is on a different chemo agents.  He is supposed to get 33 radiation treatments.  He never started cardiac rehabilitation.  He has no exertional chest tightness or pressure.  He does not feels  heart racing or skipping.  Has had no dizziness or syncope.  He has no orthopnea or PND.    Past Medical History:   Diagnosis Date   • Allergic rhinitis 8/26/2019   • Anal fistula 8/26/2019   • Arthritis 8/26/2019   • BMI 45.0-49.9, adult (CMS/HCC)    • BPH (benign prostatic hyperplasia) 8/26/2019   • Degeneration, intervertebral disc, thoracic 8/26/2019   • Degenerative joint disease 8/26/2019   • Degenerative joint disease of left hip    • Diabetic polyneuropathy (CMS/HCC) 8/26/2019   • Elevated blood sugar    • Erectile dysfunction 8/26/2019   • High blood pressure    • History of kidney stones    • Hypertension 8/26/2019   • Hypogonadism male 8/26/2019   • Left hip pain    • Nephrolithiasis    • NSTEMI (non-ST elevated myocardial infarction) (CMS/HCC) 08/2020   • Peripheral neuropathy 8/26/2019   • Preoperative clearance    • Refused influenza vaccine    • Right knee pain 8/26/2019   • Synovial cyst popliteal space    • Thoracic back pain 8/26/2019   • Type 2 diabetes mellitus (CMS/HCC) 8/26/2019         Past Surgical History:   Procedure Laterality Date   • BACK SURGERY      Lower Back Surgery   • CARDIAC CATHETERIZATION N/A 8/7/2020    Procedure: Left Heart Cath;  Surgeon: Dallas Shelton MD;  Location: [facility] CATH INVASIVE LOCATION;  Service: Cardiology;  Laterality: N/A;  CORONARY ANGIOGRAPHY, ELEVATED TROPONIN, CHEST PAIN     • CARDIAC CATHETERIZATION N/A 8/7/2020    Procedure: Left ventriculography;  Surgeon: Dallas Shelton MD;  Location: [facility] CATH INVASIVE LOCATION;  Service: Cardiology;  Laterality: N/A;   • CARDIAC CATHETERIZATION N/A 8/7/2020    Procedure: Coronary angiography;  Surgeon: Dallas Shelton MD;  [facility];  Service: Cardiology;  Laterality: N/A;   • CARDIAC CATHETERIZATION N/A 8/7/2020    Procedure: Stent MADHU coronary;  Surgeon: Dallas Shelton MD;  Location: [facility] CATH INVASIVE LOCATION;  Service: Cardiology;  Laterality: N/A;   •  COLONOSCOPY  2010    normal   • KIDNEY STONE SURGERY  2016    laser stone surgery   • LUMBAR SPINE SURGERY  1989   • SHOULDER ROTATOR CUFF REPAIR Left 2012   • TOTAL HIP ARTHROPLASTY Left 12/10/2018    Dr Barraza       Current Outpatient Medications on File Prior to Visit   Medication Sig Dispense Refill   • aspirin (aspirin) 81 MG EC tablet Take 1 tablet by mouth Daily. 90 tablet 3   • atorvastatin (LIPITOR) 40 MG tablet Take 1 tablet by mouth Every Night. 90 tablet 3   • clopidogrel (PLAVIX) 75 MG tablet Take 1 tablet by mouth Daily. 90 tablet 3   • diclofenac (VOLTAREN) 75 MG EC tablet Take 1 tablet by mouth 2 (Two) Times a Day. 180 tablet 1   • DULoxetine (CYMBALTA) 60 MG capsule TK 1 C PO BID  5   • fluticasone (FLONASE) 50 MCG/ACT nasal spray 1 spray into the nostril(s) as directed by provider.     • glucose blood test strip Use as instructed and test blood sugar two times daily 200 each 12   • glucose monitor monitoring kit Use to check blood sugar two times daily 1 each 0   • hydroCHLOROthiazide (MICROZIDE) 12.5 MG capsule Take 1 capsule by mouth Daily. 90 capsule 3   • ipratropium (ATROVENT) 0.06 % nasal spray 2 sprays into the nostril(s) as directed by provider 4 (Four) Times a Day. 1 each 12   • Lancets misc Check blood sugar two times daily 200 each 10   • lisinopril (PRINIVIL,ZESTRIL) 20 MG tablet Take 1 tablet by mouth Every 12 (Twelve) Hours. 180 tablet 3   • metFORMIN ER (GLUCOPHAGE-XR) 500 MG 24 hr tablet TAKE 1 TABLET BY MOUTH DAILY WITH BREAKFAST 90 tablet 1   • metoprolol succinate XL (TOPROL-XL) 100 MG 24 hr tablet Take 1 tablet by mouth Daily. 90 tablet 1   • pregabalin (LYRICA) 100 MG capsule TAKE ONE CAPSULE BY MOUTH THREE TIMES DAILY 90 capsule 2   • Semaglutide,0.25 or 0.5MG/DOS, (Ozempic, 0.25 or 0.5 MG/DOSE,) 2 MG/1.5ML solution pen-injector Inject 0.5 mg under the skin into the appropriate area as directed 1 (One) Time Per Week. 1.5 mL 3   • Testosterone Cypionate (DEPOTESTOTERONE  "CYPIONATE) 200 MG/ML injection INJECT 1 ML INTO THE MUSCLE AS DIRECTED EVERY 14 DAYS 2 mL 2   • vardenafil (Levitra) 20 MG tablet Take 1 tablet by mouth Daily As Needed for Erectile Dysfunction. 10 tablet 3     No current facility-administered medications on file prior to visit.        Social History     Socioeconomic History   • Marital status:      Spouse name: Not on file   • Number of children: Not on file   • Years of education: Not on file   • Highest education level: Not on file   Tobacco Use   • Smoking status: Former Smoker     Quit date:      Years since quittin.9   • Smokeless tobacco: Never Used   Substance and Sexual Activity   • Alcohol use: Yes     Comment: occasionally//Caffeine use: Occ   • Drug use: Yes     Types: Marijuana           Review of Systems   Constitution: Negative.   HENT: Negative for congestion.    Eyes: Negative for vision loss in left eye and vision loss in right eye.   Respiratory: Negative.  Negative for cough, hemoptysis, shortness of breath, sleep disturbances due to breathing, snoring, sputum production and wheezing.    Endocrine: Negative.    Hematologic/Lymphatic: Negative.    Skin: Negative for poor wound healing and rash.   Musculoskeletal: Negative for falls, gout, muscle cramps and myalgias.   Gastrointestinal: Negative for abdominal pain, diarrhea, dysphagia, hematemesis, melena, nausea and vomiting.   Neurological: Negative for excessive daytime sleepiness, dizziness, headaches, light-headedness, loss of balance, seizures and vertigo.   Psychiatric/Behavioral: Negative for depression and substance abuse. The patient is not nervous/anxious.        Procedures  Procedures        Objective:      Vitals:    20 0752   BP: 120/70   BP Location: Left arm   Pulse: 84   Weight: (!) 144 kg (318 lb 3.2 oz)   Height: 182.9 cm (72\")     Body mass index is 43.16 kg/m².    Vitals signs reviewed.   Constitutional:       General: Not in acute distress.     " Appearance: Well-developed. Not diaphoretic.   Eyes:      General: No scleral icterus.     Conjunctiva/sclera: Conjunctivae normal.   HENT:      Head: Normocephalic and atraumatic.   Neck:      Musculoskeletal: Neck supple.      Thyroid: No thyromegaly.      Vascular: No carotid bruit or JVD.      Lymphadenopathy: No cervical adenopathy.   Pulmonary:      Effort: Pulmonary effort is normal. No respiratory distress.      Breath sounds: Normal breath sounds. No wheezing. No rhonchi. No rales.   Chest:      Chest wall: Not tender to palpatation.   Cardiovascular:      Normal rate. Regular rhythm.      Murmurs: There is no murmur.      No gallop.   Pulses:     Intact distal pulses.   Edema:     Peripheral edema absent.   Abdominal:      General: Bowel sounds are normal. There is no distension or abdominal bruit.      Palpations: Abdomen is soft. There is no abdominal mass.      Tenderness: There is no abdominal tenderness.   Musculoskeletal:         General: No deformity.      Extremities: No clubbing present.  Skin:     General: Skin is warm and dry. There is no cyanosis.      Coloration: Skin is not pale.      Findings: No rash.   Neurological:      Mental Status: Alert and oriented to person, place, and time.      Cranial Nerves: No cranial nerve deficit.   Psychiatric:         Judgment: Judgment normal.         Lab Review:       Assessment:      Diagnosis Plan   1. CAD S/P percutaneous coronary angioplasty     2. Essential hypertension     3. Mixed hyperlipidemia     4. Type 2 diabetes mellitus with other circulatory complication, without long-term current use of insulin (CMS/[facility])       1. CAD, s/p RCA 8/2020.  No angina or heart failure, refer to cardiac rehabilitation.  2. DM, type 2, on Metformin and insulin  3. Hypertension.  Goal less than 120/80, is at goal.  4. Hyperlipidemia.  On atorvastatin.  5. Nasal pharyngeal carcinoma, being treated at [facility], will get the records from  there.  6. Obesity.     Plan:       See Linh in 3 months, refer to cardiac rehabilitation, no medication changes, no further testing at this time.

## 2020-11-25 NOTE — TELEPHONE ENCOUNTER
Called Dr. Cody's office [phone] and was transferred to medical records at [phone]   Spoke with Junie and requested records    Called Dr. Devante Salgado's office at [phone]   LM for Bharti in medical records to fax records

## 2020-12-17 ENCOUNTER — TELEPHONE (OUTPATIENT)
Dept: FAMILY MEDICINE CLINIC | Facility: CLINIC | Age: 60
End: 2020-12-17

## 2020-12-17 ENCOUNTER — TELEPHONE (OUTPATIENT)
Dept: CARDIOLOGY | Facility: CLINIC | Age: 60
End: 2020-12-17

## 2020-12-17 DIAGNOSIS — I10 ESSENTIAL HYPERTENSION: ICD-10-CM

## 2020-12-17 DIAGNOSIS — I10 ESSENTIAL HYPERTENSION: Primary | ICD-10-CM

## 2020-12-17 DIAGNOSIS — E11.59 TYPE 2 DIABETES MELLITUS WITH OTHER CIRCULATORY COMPLICATION, WITHOUT LONG-TERM CURRENT USE OF INSULIN (HCC): ICD-10-CM

## 2020-12-17 RX ORDER — HYDROCORTISONE SOD SUCCINATE 100 MG
VIAL (EA) INJECTION
COMMUNITY
Start: 2020-12-01

## 2020-12-17 RX ORDER — GUAIFENESIN 200 MG/10ML
LIQUID ORAL
COMMUNITY
Start: 2020-12-13

## 2020-12-17 RX ORDER — OXYCODONE HCL 5 MG/5 ML
SOLUTION, ORAL ORAL
COMMUNITY
Start: 2020-12-15

## 2020-12-17 RX ORDER — METOPROLOL TARTRATE 50 MG/1
50 TABLET, FILM COATED ORAL 2 TIMES DAILY
Qty: 60 TABLET | Refills: 0 | Status: SHIPPED | OUTPATIENT
Start: 2020-12-17 | End: 2020-12-18

## 2020-12-17 NOTE — TELEPHONE ENCOUNTER
Pts wife called that she would like to speak with you in regards to pts medication    Due to radiation pt has been placed on a feeding tube and is unable to swallow his medication   She would like to know what your suggestions for this would be    Wife can be reached at [phone]

## 2020-12-17 NOTE — TELEPHONE ENCOUNTER
PTS WIFE ODELL IS CALLING IN STATING THAT PT IS CURRENTLY UNDERGOING RADIATION AND CHEMO FOR CANCER AND DUE TO SIDE EFFECTS PT IS ON A FEEDING TUBE.  PT IS TAKING     metFORMIN ER (GLUCOPHAGE-XR) 500 MG 24 hr tablet    metoprolol succinate XL (TOPROL-XL) 100 MG 24 hr tablet    AS PRESCRIBED BY DR OMALLEY BUT THESE MEDICINES CANT BE CRUSHED BECAUSE THEY ARE TIME RELEASED.  ODELL WANTS TO KNOW IF A LIQUID OR A PILL THAT CAN BE CRUSHED CAN BE PRESCRIBED IN ORDER TO GO DOWN PTS FEEDING TUBE. PT CANT SWALLOW AND HAS NOT TAKEN EITHER OF THESE MEDICATIONS SINCE Monday.      ODELL CALL BACK  [phone]  PHARMACY  [facility]  [address] [address] [phone]

## 2020-12-18 RX ORDER — METOPROLOL TARTRATE 50 MG/1
TABLET, FILM COATED ORAL
Qty: 180 TABLET | Refills: 1 | Status: SHIPPED | OUTPATIENT
Start: 2020-12-18 | End: 2021-01-18

## 2020-12-18 NOTE — TELEPHONE ENCOUNTER
Discussed with wife.  Will change to IR versions of metformin 500 mg daily and metoprolol to tartrate version 50 mg BID

## 2020-12-27 DIAGNOSIS — E11.40 TYPE 2 DIABETES MELLITUS WITH DIABETIC NEUROPATHY, WITHOUT LONG-TERM CURRENT USE OF INSULIN (HCC): ICD-10-CM

## 2020-12-28 RX ORDER — SEMAGLUTIDE 1.34 MG/ML
INJECTION, SOLUTION SUBCUTANEOUS
Qty: 1.5 ML | Refills: 1 | Status: SHIPPED | OUTPATIENT
Start: 2020-12-28 | End: 2021-02-23

## 2021-01-16 DIAGNOSIS — I10 ESSENTIAL HYPERTENSION: ICD-10-CM

## 2021-01-16 DIAGNOSIS — H65.112 ACUTE MUCOID OTITIS MEDIA OF LEFT EAR: ICD-10-CM

## 2021-01-18 RX ORDER — CEFDINIR 300 MG/1
CAPSULE ORAL
Qty: 14 CAPSULE | Refills: 0 | OUTPATIENT
Start: 2021-01-18

## 2021-01-18 RX ORDER — METOPROLOL TARTRATE 50 MG/1
TABLET, FILM COATED ORAL
Qty: 60 TABLET | Refills: 1 | Status: SHIPPED | OUTPATIENT
Start: 2021-01-18

## 2021-01-18 RX ORDER — HYDROCHLOROTHIAZIDE 12.5 MG/1
12.5 TABLET ORAL DAILY
Qty: 90 TABLET | Refills: 0 | OUTPATIENT
Start: 2021-01-18

## 2021-01-24 DIAGNOSIS — M19.90 ARTHRITIS: ICD-10-CM

## 2021-01-25 RX ORDER — DICLOFENAC SODIUM 75 MG/1
TABLET, DELAYED RELEASE ORAL
Qty: 180 TABLET | Refills: 1 | Status: SHIPPED | OUTPATIENT
Start: 2021-01-25

## 2021-02-09 DIAGNOSIS — E11.59 TYPE 2 DIABETES MELLITUS WITH OTHER CIRCULATORY COMPLICATION, WITHOUT LONG-TERM CURRENT USE OF INSULIN (HCC): ICD-10-CM

## 2021-02-21 DIAGNOSIS — E11.40 TYPE 2 DIABETES MELLITUS WITH DIABETIC NEUROPATHY, WITHOUT LONG-TERM CURRENT USE OF INSULIN (HCC): ICD-10-CM

## 2021-02-23 ENCOUNTER — TELEPHONE (OUTPATIENT)
Dept: CARDIOLOGY | Facility: CLINIC | Age: 61
End: 2021-02-23

## 2021-02-23 DIAGNOSIS — E11.59 TYPE 2 DIABETES MELLITUS WITH OTHER CIRCULATORY COMPLICATION, WITHOUT LONG-TERM CURRENT USE OF INSULIN (HCC): ICD-10-CM

## 2021-02-23 RX ORDER — METFORMIN HYDROCHLORIDE 500 MG/1
500 TABLET, EXTENDED RELEASE ORAL
Qty: 90 TABLET | Refills: 1 | OUTPATIENT
Start: 2021-02-23

## 2021-02-23 RX ORDER — SEMAGLUTIDE 1.34 MG/ML
INJECTION, SOLUTION SUBCUTANEOUS
Qty: 1.5 ML | Refills: 1 | Status: SHIPPED | OUTPATIENT
Start: 2021-02-23

## 2021-03-19 ENCOUNTER — BULK ORDERING (OUTPATIENT)
Dept: CASE MANAGEMENT | Facility: OTHER | Age: 61
End: 2021-03-19

## 2021-03-19 DIAGNOSIS — Z23 IMMUNIZATION DUE: ICD-10-CM

## 2024-03-07 NOTE — TELEPHONE ENCOUNTER
I believe in December the patient was changed from the extended release version to the immediate release version of the Metformin.  Please clarify with patient I believe he is taking the immediate release secondary to cost of medication.  
LOV 8/17/20  No scheduled f/u   Last labs 11/4/20  
Patient is taking immediate release due to cost and feeding tube  
no

## (undated) DEVICE — SKIN PREP TRAY W/CHG: Brand: MEDLINE INDUSTRIES, INC.

## (undated) DEVICE — PK CATH CARD 40

## (undated) DEVICE — KT MANIFLD CARDIAC

## (undated) DEVICE — CATH DIAG IMPULSE PIG145 6F 110CM

## (undated) DEVICE — 6F .070 JR 4 100CM: Brand: CORDIS

## (undated) DEVICE — DEV INDEFLATOR

## (undated) DEVICE — GLIDESHEATH BASIC HYDROPHILIC COATED INTRODUCER SHEATH: Brand: GLIDESHEATH

## (undated) DEVICE — CATH DIAG IMPULSE FL3.5 6F 100CM

## (undated) DEVICE — CATH DIAG IMPULSE FR4 6F 100CM

## (undated) DEVICE — GW EMR FIX EXCHG J STD .035 3MM 260CM

## (undated) DEVICE — TREK CORONARY DILATATION CATHETER 3.50 MM X 15 MM / RAPID-EXCHANGE: Brand: TREK

## (undated) DEVICE — HI-TORQUE EXTRA S'PORT GUIDE WIRE .014 STRAIGHT TIP 3.0 CM X 190 CM: Brand: HI-TORQUE EXTRA S'PORT

## (undated) DEVICE — TR BAND RADIAL ARTERY COMPRESSION DEVICE: Brand: TR BAND